# Patient Record
Sex: FEMALE | Race: WHITE | NOT HISPANIC OR LATINO
[De-identification: names, ages, dates, MRNs, and addresses within clinical notes are randomized per-mention and may not be internally consistent; named-entity substitution may affect disease eponyms.]

---

## 2021-01-01 ENCOUNTER — TRANSCRIPTION ENCOUNTER (OUTPATIENT)
Age: 71
End: 2021-01-01

## 2021-01-01 ENCOUNTER — EMERGENCY (EMERGENCY)
Facility: HOSPITAL | Age: 71
LOS: 1 days | Discharge: ANOTHER HOSPITAL | End: 2021-01-01
Attending: INTERNAL MEDICINE | Admitting: EMERGENCY MEDICINE
Payer: MEDICARE

## 2021-01-01 ENCOUNTER — RESULT REVIEW (OUTPATIENT)
Age: 71
End: 2021-01-01

## 2021-01-01 ENCOUNTER — APPOINTMENT (OUTPATIENT)
Dept: GYNECOLOGIC ONCOLOGY | Facility: HOSPITAL | Age: 71
End: 2021-01-01

## 2021-01-01 ENCOUNTER — INPATIENT (INPATIENT)
Facility: HOSPITAL | Age: 71
LOS: 0 days | End: 2021-09-16
Attending: OBSTETRICS & GYNECOLOGY | Admitting: OBSTETRICS & GYNECOLOGY
Payer: MEDICARE

## 2021-01-01 VITALS
RESPIRATION RATE: 16 BRPM | TEMPERATURE: 98 F | SYSTOLIC BLOOD PRESSURE: 91 MMHG | DIASTOLIC BLOOD PRESSURE: 62 MMHG | HEIGHT: 64 IN | HEART RATE: 110 BPM | WEIGHT: 164.91 LBS | OXYGEN SATURATION: 98 %

## 2021-01-01 VITALS
RESPIRATION RATE: 16 BRPM | HEART RATE: 140 BPM | SYSTOLIC BLOOD PRESSURE: 113 MMHG | DIASTOLIC BLOOD PRESSURE: 71 MMHG | OXYGEN SATURATION: 94 %

## 2021-01-01 VITALS
TEMPERATURE: 99 F | DIASTOLIC BLOOD PRESSURE: 98 MMHG | SYSTOLIC BLOOD PRESSURE: 133 MMHG | RESPIRATION RATE: 18 BRPM | OXYGEN SATURATION: 94 % | HEART RATE: 123 BPM

## 2021-01-01 VITALS — HEART RATE: 49 BPM | RESPIRATION RATE: 36 BRPM | OXYGEN SATURATION: 89 %

## 2021-01-01 DIAGNOSIS — R19.00 INTRA-ABDOMINAL AND PELVIC SWELLING, MASS AND LUMP, UNSPECIFIED SITE: ICD-10-CM

## 2021-01-01 LAB
ALBUMIN SERPL ELPH-MCNC: 3 G/DL — LOW (ref 3.3–5)
ALBUMIN SERPL ELPH-MCNC: 3.2 G/DL — LOW (ref 3.3–5)
ALP SERPL-CCNC: 74 U/L — SIGNIFICANT CHANGE UP (ref 40–120)
ALP SERPL-CCNC: 92 U/L — SIGNIFICANT CHANGE UP (ref 40–120)
ALT FLD-CCNC: 16 U/L — SIGNIFICANT CHANGE UP (ref 12–78)
ALT FLD-CCNC: 9 U/L — SIGNIFICANT CHANGE UP (ref 4–33)
AMYLASE P1 CFR SERPL: 29 U/L — SIGNIFICANT CHANGE UP (ref 25–125)
ANION GAP SERPL CALC-SCNC: 10 MMOL/L — SIGNIFICANT CHANGE UP (ref 5–17)
ANION GAP SERPL CALC-SCNC: 15 MMOL/L — HIGH (ref 7–14)
ANION GAP SERPL CALC-SCNC: 17 MMOL/L — HIGH (ref 7–14)
ANION GAP SERPL CALC-SCNC: SIGNIFICANT CHANGE UP MMOL/L (ref 7–14)
APPEARANCE UR: ABNORMAL
APTT BLD: 25.6 SEC — LOW (ref 27–36.3)
APTT BLD: 26 SEC — LOW (ref 27.5–35.5)
APTT BLD: 30.8 SEC — SIGNIFICANT CHANGE UP (ref 27–36.3)
AST SERPL-CCNC: 31 U/L — SIGNIFICANT CHANGE UP (ref 15–37)
AST SERPL-CCNC: 33 U/L — HIGH (ref 4–32)
BACTERIA # UR AUTO: ABNORMAL
BASOPHILS # BLD AUTO: 0.03 K/UL — SIGNIFICANT CHANGE UP (ref 0–0.2)
BASOPHILS NFR BLD AUTO: 0.9 % — SIGNIFICANT CHANGE UP (ref 0–2)
BILIRUB SERPL-MCNC: 2 MG/DL — HIGH (ref 0.2–1.2)
BILIRUB SERPL-MCNC: 3 MG/DL — HIGH (ref 0.2–1.2)
BILIRUB UR-MCNC: NEGATIVE — SIGNIFICANT CHANGE UP
BLD GP AB SCN SERPL QL: NEGATIVE — SIGNIFICANT CHANGE UP
BLD GP AB SCN SERPL QL: SIGNIFICANT CHANGE UP
BLOOD GAS ARTERIAL - LYTES,HGB,ICA,LACT RESULT: SIGNIFICANT CHANGE UP
BLOOD GAS ARTERIAL COMPREHENSIVE RESULT: SIGNIFICANT CHANGE UP
BLOOD GAS VENOUS COMPREHENSIVE RESULT: SIGNIFICANT CHANGE UP
BUN SERPL-MCNC: 20 MG/DL — SIGNIFICANT CHANGE UP (ref 7–23)
BUN SERPL-MCNC: 24 MG/DL — HIGH (ref 7–23)
BUN SERPL-MCNC: 25 MG/DL — HIGH (ref 7–23)
BUN SERPL-MCNC: 28 MG/DL — HIGH (ref 7–23)
CALCIUM SERPL-MCNC: 7.2 MG/DL — LOW (ref 8.4–10.5)
CALCIUM SERPL-MCNC: 7.3 MG/DL — LOW (ref 8.4–10.5)
CALCIUM SERPL-MCNC: 8.5 MG/DL — SIGNIFICANT CHANGE UP (ref 8.4–10.5)
CALCIUM SERPL-MCNC: 8.6 MG/DL — SIGNIFICANT CHANGE UP (ref 8.5–10.1)
CHLORIDE SERPL-SCNC: 106 MMOL/L — SIGNIFICANT CHANGE UP (ref 96–108)
CHLORIDE SERPL-SCNC: 106 MMOL/L — SIGNIFICANT CHANGE UP (ref 98–107)
CHLORIDE SERPL-SCNC: 112 MMOL/L — HIGH (ref 98–107)
CHLORIDE SERPL-SCNC: 112 MMOL/L — HIGH (ref 98–107)
CO2 SERPL-SCNC: 13 MMOL/L — LOW (ref 22–31)
CO2 SERPL-SCNC: 14 MMOL/L — LOW (ref 22–31)
CO2 SERPL-SCNC: 21 MMOL/L — LOW (ref 22–31)
CO2 SERPL-SCNC: <7 MMOL/L — CRITICAL LOW (ref 22–31)
COLOR SPEC: YELLOW — SIGNIFICANT CHANGE UP
CREAT SERPL-MCNC: 1.25 MG/DL — SIGNIFICANT CHANGE UP (ref 0.5–1.3)
CREAT SERPL-MCNC: 1.27 MG/DL — SIGNIFICANT CHANGE UP (ref 0.5–1.3)
CREAT SERPL-MCNC: 1.3 MG/DL — SIGNIFICANT CHANGE UP (ref 0.5–1.3)
CREAT SERPL-MCNC: 1.34 MG/DL — HIGH (ref 0.5–1.3)
DIFF PNL FLD: ABNORMAL
EOSINOPHIL # BLD AUTO: 0.03 K/UL — SIGNIFICANT CHANGE UP (ref 0–0.5)
EOSINOPHIL NFR BLD AUTO: 0.9 % — SIGNIFICANT CHANGE UP (ref 0–6)
EPI CELLS # UR: SIGNIFICANT CHANGE UP
GAS PNL BLDA: SIGNIFICANT CHANGE UP
GLUCOSE SERPL-MCNC: 127 MG/DL — HIGH (ref 70–99)
GLUCOSE SERPL-MCNC: 76 MG/DL — SIGNIFICANT CHANGE UP (ref 70–99)
GLUCOSE SERPL-MCNC: 78 MG/DL — SIGNIFICANT CHANGE UP (ref 70–99)
GLUCOSE SERPL-MCNC: 78 MG/DL — SIGNIFICANT CHANGE UP (ref 70–99)
GLUCOSE UR QL: NEGATIVE — SIGNIFICANT CHANGE UP
GRAM STN FLD: SIGNIFICANT CHANGE UP
GRAM STN FLD: SIGNIFICANT CHANGE UP
HCT VFR BLD CALC: 24.2 % — LOW (ref 34.5–45)
HCT VFR BLD CALC: 27.3 % — LOW (ref 34.5–45)
HCT VFR BLD CALC: 30.4 % — LOW (ref 34.5–45)
HCT VFR BLD CALC: 32.8 % — LOW (ref 34.5–45)
HGB BLD-MCNC: 10.5 G/DL — LOW (ref 11.5–15.5)
HGB BLD-MCNC: 7.6 G/DL — LOW (ref 11.5–15.5)
HGB BLD-MCNC: 8.1 G/DL — LOW (ref 11.5–15.5)
HGB BLD-MCNC: 9.5 G/DL — LOW (ref 11.5–15.5)
IANC: 2.33 K/UL — SIGNIFICANT CHANGE UP (ref 1.5–8.5)
INR BLD: 1.17 RATIO — HIGH (ref 0.88–1.16)
INR BLD: 1.26 RATIO — HIGH (ref 0.88–1.16)
INR BLD: 1.34 RATIO — HIGH (ref 0.88–1.16)
KETONES UR-MCNC: ABNORMAL
LACTATE SERPL-SCNC: 2.4 MMOL/L — HIGH (ref 0.5–2)
LACTATE SERPL-SCNC: 3.1 MMOL/L — HIGH (ref 0.7–2)
LACTATE SERPL-SCNC: 5.9 MMOL/L — CRITICAL HIGH (ref 0.7–2)
LEUKOCYTE ESTERASE UR-ACNC: ABNORMAL
LIDOCAIN IGE QN: 56 U/L — LOW (ref 73–393)
LYMPHOCYTES # BLD AUTO: 0.69 K/UL — LOW (ref 1–3.3)
LYMPHOCYTES # BLD AUTO: 22.1 % — SIGNIFICANT CHANGE UP (ref 13–44)
MAGNESIUM SERPL-MCNC: 1.6 MG/DL — SIGNIFICANT CHANGE UP (ref 1.6–2.6)
MAGNESIUM SERPL-MCNC: 2 MG/DL — SIGNIFICANT CHANGE UP (ref 1.6–2.6)
MCHC RBC-ENTMCNC: 25.3 PG — LOW (ref 27–34)
MCHC RBC-ENTMCNC: 25.7 PG — LOW (ref 27–34)
MCHC RBC-ENTMCNC: 27.7 PG — SIGNIFICANT CHANGE UP (ref 27–34)
MCHC RBC-ENTMCNC: 28.6 PG — SIGNIFICANT CHANGE UP (ref 27–34)
MCHC RBC-ENTMCNC: 29.7 GM/DL — LOW (ref 32–36)
MCHC RBC-ENTMCNC: 31.3 GM/DL — LOW (ref 32–36)
MCHC RBC-ENTMCNC: 31.4 GM/DL — LOW (ref 32–36)
MCHC RBC-ENTMCNC: 32 GM/DL — SIGNIFICANT CHANGE UP (ref 32–36)
MCV RBC AUTO: 80.9 FL — SIGNIFICANT CHANGE UP (ref 80–100)
MCV RBC AUTO: 86.5 FL — SIGNIFICANT CHANGE UP (ref 80–100)
MCV RBC AUTO: 86.7 FL — SIGNIFICANT CHANGE UP (ref 80–100)
MCV RBC AUTO: 91 FL — SIGNIFICANT CHANGE UP (ref 80–100)
MONOCYTES # BLD AUTO: 0.11 K/UL — SIGNIFICANT CHANGE UP (ref 0–0.9)
MONOCYTES NFR BLD AUTO: 3.5 % — SIGNIFICANT CHANGE UP (ref 2–14)
NEUTROPHILS # BLD AUTO: 2.1 K/UL — SIGNIFICANT CHANGE UP (ref 1.8–7.4)
NEUTROPHILS NFR BLD AUTO: 33.6 % — LOW (ref 43–77)
NITRITE UR-MCNC: NEGATIVE — SIGNIFICANT CHANGE UP
NRBC # BLD: 0 /100 WBCS — SIGNIFICANT CHANGE UP (ref 0–0)
NRBC # BLD: 1 /100 WBCS — SIGNIFICANT CHANGE UP
NRBC # BLD: 1 /100 WBCS — SIGNIFICANT CHANGE UP
NRBC # FLD: 0.07 K/UL — HIGH
NRBC # FLD: 0.09 K/UL — HIGH
PH UR: 5 — SIGNIFICANT CHANGE UP (ref 5–8)
PHOSPHATE SERPL-MCNC: 5.3 MG/DL — HIGH (ref 2.5–4.5)
PHOSPHATE SERPL-MCNC: 7.4 MG/DL — HIGH (ref 2.5–4.5)
PLATELET # BLD AUTO: 142 K/UL — LOW (ref 150–400)
PLATELET # BLD AUTO: 216 K/UL — SIGNIFICANT CHANGE UP (ref 150–400)
PLATELET # BLD AUTO: 316 K/UL — SIGNIFICANT CHANGE UP (ref 150–400)
PLATELET # BLD AUTO: 343 K/UL — SIGNIFICANT CHANGE UP (ref 150–400)
POTASSIUM SERPL-MCNC: 3.5 MMOL/L — SIGNIFICANT CHANGE UP (ref 3.5–5.3)
POTASSIUM SERPL-MCNC: 3.8 MMOL/L — SIGNIFICANT CHANGE UP (ref 3.5–5.3)
POTASSIUM SERPL-MCNC: 4.4 MMOL/L — SIGNIFICANT CHANGE UP (ref 3.5–5.3)
POTASSIUM SERPL-MCNC: SIGNIFICANT CHANGE UP MMOL/L (ref 3.5–5.3)
POTASSIUM SERPL-SCNC: 3.5 MMOL/L — SIGNIFICANT CHANGE UP (ref 3.5–5.3)
POTASSIUM SERPL-SCNC: 3.8 MMOL/L — SIGNIFICANT CHANGE UP (ref 3.5–5.3)
POTASSIUM SERPL-SCNC: 4.4 MMOL/L — SIGNIFICANT CHANGE UP (ref 3.5–5.3)
POTASSIUM SERPL-SCNC: SIGNIFICANT CHANGE UP MMOL/L (ref 3.5–5.3)
PROT SERPL-MCNC: 5.5 G/DL — LOW (ref 6–8.3)
PROT SERPL-MCNC: 7 G/DL — SIGNIFICANT CHANGE UP (ref 6–8.3)
PROT UR-MCNC: 15
PROTHROM AB SERPL-ACNC: 13.6 SEC — SIGNIFICANT CHANGE UP (ref 10.6–13.6)
PROTHROM AB SERPL-ACNC: 14.3 SEC — HIGH (ref 10.6–13.6)
PROTHROM AB SERPL-ACNC: 15.2 SEC — HIGH (ref 10.6–13.6)
RBC # BLD: 2.66 M/UL — LOW (ref 3.8–5.2)
RBC # BLD: 3.15 M/UL — LOW (ref 3.8–5.2)
RBC # BLD: 3.76 M/UL — LOW (ref 3.8–5.2)
RBC # BLD: 3.79 M/UL — LOW (ref 3.8–5.2)
RBC # FLD: 15.3 % — HIGH (ref 10.3–14.5)
RBC # FLD: 15.9 % — HIGH (ref 10.3–14.5)
RBC # FLD: 16.2 % — HIGH (ref 10.3–14.5)
RBC # FLD: 17 % — HIGH (ref 10.3–14.5)
RBC CASTS # UR COMP ASSIST: SIGNIFICANT CHANGE UP /HPF (ref 0–4)
RH IG SCN BLD-IMP: POSITIVE — SIGNIFICANT CHANGE UP
RH IG SCN BLD-IMP: POSITIVE — SIGNIFICANT CHANGE UP
SARS-COV-2 RNA SPEC QL NAA+PROBE: SIGNIFICANT CHANGE UP
SODIUM SERPL-SCNC: 136 MMOL/L — SIGNIFICANT CHANGE UP (ref 135–145)
SODIUM SERPL-SCNC: 137 MMOL/L — SIGNIFICANT CHANGE UP (ref 135–145)
SODIUM SERPL-SCNC: 137 MMOL/L — SIGNIFICANT CHANGE UP (ref 135–145)
SODIUM SERPL-SCNC: 140 MMOL/L — SIGNIFICANT CHANGE UP (ref 135–145)
SP GR SPEC: 1.01 — SIGNIFICANT CHANGE UP (ref 1.01–1.02)
SPECIMEN SOURCE: SIGNIFICANT CHANGE UP
SPECIMEN SOURCE: SIGNIFICANT CHANGE UP
UROBILINOGEN FLD QL: NEGATIVE — SIGNIFICANT CHANGE UP
WBC # BLD: 11.36 K/UL — HIGH (ref 3.8–10.5)
WBC # BLD: 3.13 K/UL — LOW (ref 3.8–10.5)
WBC # BLD: 7.25 K/UL — SIGNIFICANT CHANGE UP (ref 3.8–10.5)
WBC # BLD: 7.26 K/UL — SIGNIFICANT CHANGE UP (ref 3.8–10.5)
WBC # FLD AUTO: 11.36 K/UL — HIGH (ref 3.8–10.5)
WBC # FLD AUTO: 3.13 K/UL — LOW (ref 3.8–10.5)
WBC # FLD AUTO: 7.25 K/UL — SIGNIFICANT CHANGE UP (ref 3.8–10.5)
WBC # FLD AUTO: 7.26 K/UL — SIGNIFICANT CHANGE UP (ref 3.8–10.5)
WBC UR QL: SIGNIFICANT CHANGE UP

## 2021-01-01 PROCEDURE — 88305 TISSUE EXAM BY PATHOLOGIST: CPT | Mod: 26

## 2021-01-01 PROCEDURE — 99285 EMERGENCY DEPT VISIT HI MDM: CPT

## 2021-01-01 PROCEDURE — 87086 URINE CULTURE/COLONY COUNT: CPT

## 2021-01-01 PROCEDURE — 96375 TX/PRO/DX INJ NEW DRUG ADDON: CPT

## 2021-01-01 PROCEDURE — 71045 X-RAY EXAM CHEST 1 VIEW: CPT | Mod: 26

## 2021-01-01 PROCEDURE — 99223 1ST HOSP IP/OBS HIGH 75: CPT | Mod: 25,57,GC

## 2021-01-01 PROCEDURE — 74177 CT ABD & PELVIS W/CONTRAST: CPT | Mod: 26,MA

## 2021-01-01 PROCEDURE — 92950 HEART/LUNG RESUSCITATION CPR: CPT | Mod: GC

## 2021-01-01 PROCEDURE — 97608 NEG PRS WND THER NDME>50SQCM: CPT | Mod: GC

## 2021-01-01 PROCEDURE — 85730 THROMBOPLASTIN TIME PARTIAL: CPT

## 2021-01-01 PROCEDURE — 99285 EMERGENCY DEPT VISIT HI MDM: CPT | Mod: 25

## 2021-01-01 PROCEDURE — 71045 X-RAY EXAM CHEST 1 VIEW: CPT | Mod: 26,77

## 2021-01-01 PROCEDURE — 93010 ELECTROCARDIOGRAM REPORT: CPT

## 2021-01-01 PROCEDURE — 88307 TISSUE EXAM BY PATHOLOGIST: CPT | Mod: 26

## 2021-01-01 PROCEDURE — 44120 REMOVAL OF SMALL INTESTINE: CPT | Mod: GC

## 2021-01-01 PROCEDURE — 44120 REMOVAL OF SMALL INTESTINE: CPT | Mod: 52,62

## 2021-01-01 PROCEDURE — 50715 RELEASE OF URETER: CPT | Mod: 50,59

## 2021-01-01 PROCEDURE — 81001 URINALYSIS AUTO W/SCOPE: CPT

## 2021-01-01 PROCEDURE — 83605 ASSAY OF LACTIC ACID: CPT

## 2021-01-01 PROCEDURE — 80053 COMPREHEN METABOLIC PANEL: CPT

## 2021-01-01 PROCEDURE — 86901 BLOOD TYPING SEROLOGIC RH(D): CPT

## 2021-01-01 PROCEDURE — 86900 BLOOD TYPING SEROLOGIC ABO: CPT

## 2021-01-01 PROCEDURE — 58950 RESECT OVARIAN MALIGNANCY: CPT

## 2021-01-01 PROCEDURE — 96361 HYDRATE IV INFUSION ADD-ON: CPT

## 2021-01-01 PROCEDURE — 88332 PATH CONSLTJ SURG EA ADD BLK: CPT | Mod: 26

## 2021-01-01 PROCEDURE — 82150 ASSAY OF AMYLASE: CPT

## 2021-01-01 PROCEDURE — 87040 BLOOD CULTURE FOR BACTERIA: CPT

## 2021-01-01 PROCEDURE — 86850 RBC ANTIBODY SCREEN: CPT

## 2021-01-01 PROCEDURE — 88331 PATH CONSLTJ SURG 1 BLK 1SPC: CPT | Mod: 26

## 2021-01-01 PROCEDURE — 96374 THER/PROPH/DIAG INJ IV PUSH: CPT | Mod: XU

## 2021-01-01 PROCEDURE — U0005: CPT

## 2021-01-01 PROCEDURE — 71045 X-RAY EXAM CHEST 1 VIEW: CPT

## 2021-01-01 PROCEDURE — 74177 CT ABD & PELVIS W/CONTRAST: CPT | Mod: MA

## 2021-01-01 PROCEDURE — 83690 ASSAY OF LIPASE: CPT

## 2021-01-01 PROCEDURE — 88360 TUMOR IMMUNOHISTOCHEM/MANUAL: CPT | Mod: 26

## 2021-01-01 PROCEDURE — 36415 COLL VENOUS BLD VENIPUNCTURE: CPT

## 2021-01-01 PROCEDURE — 99291 CRITICAL CARE FIRST HOUR: CPT | Mod: 25,GC

## 2021-01-01 PROCEDURE — 88341 IMHCHEM/IMCYTCHM EA ADD ANTB: CPT | Mod: 26,59

## 2021-01-01 PROCEDURE — 85610 PROTHROMBIN TIME: CPT

## 2021-01-01 PROCEDURE — 87077 CULTURE AEROBIC IDENTIFY: CPT

## 2021-01-01 PROCEDURE — 93005 ELECTROCARDIOGRAM TRACING: CPT

## 2021-01-01 PROCEDURE — 88342 IMHCHEM/IMCYTCHM 1ST ANTB: CPT | Mod: 26,59

## 2021-01-01 PROCEDURE — 85027 COMPLETE CBC AUTOMATED: CPT

## 2021-01-01 PROCEDURE — U0003: CPT

## 2021-01-01 RX ORDER — FENTANYL CITRATE 50 UG/ML
50 INJECTION INTRAVENOUS ONCE
Refills: 0 | Status: DISCONTINUED | OUTPATIENT
Start: 2021-01-01 | End: 2021-01-01

## 2021-01-01 RX ORDER — MAGNESIUM SULFATE 500 MG/ML
2 VIAL (ML) INJECTION ONCE
Refills: 0 | Status: COMPLETED | OUTPATIENT
Start: 2021-01-01 | End: 2021-01-01

## 2021-01-01 RX ORDER — SODIUM CHLORIDE 9 MG/ML
10 INJECTION INTRAMUSCULAR; INTRAVENOUS; SUBCUTANEOUS
Refills: 0 | Status: DISCONTINUED | OUTPATIENT
Start: 2021-01-01 | End: 2021-01-01

## 2021-01-01 RX ORDER — ACETAMINOPHEN 500 MG
1000 TABLET ORAL ONCE
Refills: 0 | Status: COMPLETED | OUTPATIENT
Start: 2021-01-01 | End: 2021-01-01

## 2021-01-01 RX ORDER — PHENYLEPHRINE HYDROCHLORIDE 10 MG/ML
0 INJECTION INTRAVENOUS
Qty: 40 | Refills: 0 | Status: DISCONTINUED | OUTPATIENT
Start: 2021-01-01 | End: 2021-01-01

## 2021-01-01 RX ORDER — SODIUM CHLORIDE 9 MG/ML
1000 INJECTION INTRAMUSCULAR; INTRAVENOUS; SUBCUTANEOUS ONCE
Refills: 0 | Status: COMPLETED | OUTPATIENT
Start: 2021-01-01 | End: 2021-01-01

## 2021-01-01 RX ORDER — NOREPINEPHRINE BITARTRATE/D5W 8 MG/250ML
0.05 PLASTIC BAG, INJECTION (ML) INTRAVENOUS
Qty: 8 | Refills: 0 | Status: DISCONTINUED | OUTPATIENT
Start: 2021-01-01 | End: 2021-01-01

## 2021-01-01 RX ORDER — MORPHINE SULFATE 50 MG/1
4 CAPSULE, EXTENDED RELEASE ORAL ONCE
Refills: 0 | Status: DISCONTINUED | OUTPATIENT
Start: 2021-01-01 | End: 2021-01-01

## 2021-01-01 RX ORDER — INSULIN HUMAN 100 [IU]/ML
10 INJECTION, SOLUTION SUBCUTANEOUS ONCE
Refills: 0 | Status: COMPLETED | OUTPATIENT
Start: 2021-01-01 | End: 2021-01-01

## 2021-01-01 RX ORDER — SODIUM BICARBONATE 1 MEQ/ML
50 SYRINGE (ML) INTRAVENOUS ONCE
Refills: 0 | Status: COMPLETED | OUTPATIENT
Start: 2021-01-01 | End: 2021-01-01

## 2021-01-01 RX ORDER — PIPERACILLIN AND TAZOBACTAM 4; .5 G/20ML; G/20ML
3.38 INJECTION, POWDER, LYOPHILIZED, FOR SOLUTION INTRAVENOUS EVERY 8 HOURS
Refills: 0 | Status: DISCONTINUED | OUTPATIENT
Start: 2021-01-01 | End: 2021-01-01

## 2021-01-01 RX ORDER — HYDROMORPHONE HYDROCHLORIDE 2 MG/ML
0.5 INJECTION INTRAMUSCULAR; INTRAVENOUS; SUBCUTANEOUS ONCE
Refills: 0 | Status: DISCONTINUED | OUTPATIENT
Start: 2021-01-01 | End: 2021-01-01

## 2021-01-01 RX ORDER — CHLORHEXIDINE GLUCONATE 213 G/1000ML
15 SOLUTION TOPICAL EVERY 12 HOURS
Refills: 0 | Status: DISCONTINUED | OUTPATIENT
Start: 2021-01-01 | End: 2021-01-01

## 2021-01-01 RX ORDER — CHLORHEXIDINE GLUCONATE 213 G/1000ML
1 SOLUTION TOPICAL
Refills: 0 | Status: DISCONTINUED | OUTPATIENT
Start: 2021-01-01 | End: 2021-01-01

## 2021-01-01 RX ORDER — VASOPRESSIN 20 [USP'U]/ML
0.04 INJECTION INTRAVENOUS
Qty: 50 | Refills: 0 | Status: DISCONTINUED | OUTPATIENT
Start: 2021-01-01 | End: 2021-01-01

## 2021-01-01 RX ORDER — SODIUM CHLORIDE 9 MG/ML
2300 INJECTION INTRAMUSCULAR; INTRAVENOUS; SUBCUTANEOUS ONCE
Refills: 0 | Status: COMPLETED | OUTPATIENT
Start: 2021-01-01 | End: 2021-01-01

## 2021-01-01 RX ORDER — NOREPINEPHRINE BITARTRATE/D5W 8 MG/250ML
0.1 PLASTIC BAG, INJECTION (ML) INTRAVENOUS
Qty: 8 | Refills: 0 | Status: DISCONTINUED | OUTPATIENT
Start: 2021-01-01 | End: 2021-01-01

## 2021-01-01 RX ORDER — SODIUM BICARBONATE 1 MEQ/ML
0.15 SYRINGE (ML) INTRAVENOUS
Qty: 150 | Refills: 0 | Status: DISCONTINUED | OUTPATIENT
Start: 2021-01-01 | End: 2021-01-01

## 2021-01-01 RX ORDER — CALCIUM GLUCONATE 100 MG/ML
2 VIAL (ML) INTRAVENOUS ONCE
Refills: 0 | Status: COMPLETED | OUTPATIENT
Start: 2021-01-01 | End: 2021-01-01

## 2021-01-01 RX ORDER — HEPARIN SODIUM 5000 [USP'U]/ML
5000 INJECTION INTRAVENOUS; SUBCUTANEOUS EVERY 8 HOURS
Refills: 0 | Status: DISCONTINUED | OUTPATIENT
Start: 2021-01-01 | End: 2021-01-01

## 2021-01-01 RX ORDER — PROPOFOL 10 MG/ML
30 INJECTION, EMULSION INTRAVENOUS
Qty: 500 | Refills: 0 | Status: DISCONTINUED | OUTPATIENT
Start: 2021-01-01 | End: 2021-01-01

## 2021-01-01 RX ORDER — PIPERACILLIN AND TAZOBACTAM 4; .5 G/20ML; G/20ML
3.38 INJECTION, POWDER, LYOPHILIZED, FOR SOLUTION INTRAVENOUS ONCE
Refills: 0 | Status: COMPLETED | OUTPATIENT
Start: 2021-01-01 | End: 2021-01-01

## 2021-01-01 RX ORDER — ONDANSETRON 8 MG/1
4 TABLET, FILM COATED ORAL ONCE
Refills: 0 | Status: COMPLETED | OUTPATIENT
Start: 2021-01-01 | End: 2021-01-01

## 2021-01-01 RX ORDER — SODIUM CHLORIDE 9 MG/ML
1000 INJECTION INTRAMUSCULAR; INTRAVENOUS; SUBCUTANEOUS ONCE
Refills: 0 | Status: DISCONTINUED | OUTPATIENT
Start: 2021-01-01 | End: 2021-01-01

## 2021-01-01 RX ORDER — SODIUM CHLORIDE 9 MG/ML
1000 INJECTION, SOLUTION INTRAVENOUS
Refills: 0 | Status: DISCONTINUED | OUTPATIENT
Start: 2021-01-01 | End: 2021-01-01

## 2021-01-01 RX ORDER — PIPERACILLIN AND TAZOBACTAM 4; .5 G/20ML; G/20ML
3.38 INJECTION, POWDER, LYOPHILIZED, FOR SOLUTION INTRAVENOUS ONCE
Refills: 0 | Status: DISCONTINUED | OUTPATIENT
Start: 2021-01-01 | End: 2021-01-01

## 2021-01-01 RX ORDER — DEXTROSE 50 % IN WATER 50 %
50 SYRINGE (ML) INTRAVENOUS ONCE
Refills: 0 | Status: COMPLETED | OUTPATIENT
Start: 2021-01-01 | End: 2021-01-01

## 2021-01-01 RX ADMIN — PIPERACILLIN AND TAZOBACTAM 200 GRAM(S): 4; .5 INJECTION, POWDER, LYOPHILIZED, FOR SOLUTION INTRAVENOUS at 05:51

## 2021-01-01 RX ADMIN — Medication 400 MILLIGRAM(S): at 09:59

## 2021-01-01 RX ADMIN — PROPOFOL 12.6 MICROGRAM(S)/KG/MIN: 10 INJECTION, EMULSION INTRAVENOUS at 19:00

## 2021-01-01 RX ADMIN — SODIUM CHLORIDE 75 MILLILITER(S): 9 INJECTION, SOLUTION INTRAVENOUS at 19:00

## 2021-01-01 RX ADMIN — MORPHINE SULFATE 4 MILLIGRAM(S): 50 CAPSULE, EXTENDED RELEASE ORAL at 04:42

## 2021-01-01 RX ADMIN — Medication 50 MILLIEQUIVALENT(S): at 21:00

## 2021-01-01 RX ADMIN — Medication 1000 MILLIGRAM(S): at 09:34

## 2021-01-01 RX ADMIN — Medication 6.56 MICROGRAM(S)/KG/MIN: at 11:30

## 2021-01-01 RX ADMIN — SODIUM CHLORIDE 2300 MILLILITER(S): 9 INJECTION INTRAMUSCULAR; INTRAVENOUS; SUBCUTANEOUS at 07:01

## 2021-01-01 RX ADMIN — SODIUM CHLORIDE 2300 MILLILITER(S): 9 INJECTION INTRAMUSCULAR; INTRAVENOUS; SUBCUTANEOUS at 06:01

## 2021-01-01 RX ADMIN — PHENYLEPHRINE HYDROCHLORIDE 0.1 MICROGRAM(S)/KG/MIN: 10 INJECTION INTRAVENOUS at 19:00

## 2021-01-01 RX ADMIN — FENTANYL CITRATE 50 MICROGRAM(S): 50 INJECTION INTRAVENOUS at 11:30

## 2021-01-01 RX ADMIN — HYDROMORPHONE HYDROCHLORIDE 0.5 MILLIGRAM(S): 2 INJECTION INTRAMUSCULAR; INTRAVENOUS; SUBCUTANEOUS at 05:48

## 2021-01-01 RX ADMIN — ONDANSETRON 4 MILLIGRAM(S): 8 TABLET, FILM COATED ORAL at 04:14

## 2021-01-01 RX ADMIN — SODIUM CHLORIDE 1000 MILLILITER(S): 9 INJECTION INTRAMUSCULAR; INTRAVENOUS; SUBCUTANEOUS at 09:15

## 2021-01-01 RX ADMIN — PROPOFOL 12.6 MICROGRAM(S)/KG/MIN: 10 INJECTION, EMULSION INTRAVENOUS at 17:19

## 2021-01-01 RX ADMIN — Medication 6.56 MICROGRAM(S)/KG/MIN: at 17:19

## 2021-01-01 RX ADMIN — SODIUM CHLORIDE 1000 MILLILITER(S): 9 INJECTION INTRAMUSCULAR; INTRAVENOUS; SUBCUTANEOUS at 04:13

## 2021-01-01 RX ADMIN — VASOPRESSIN 2.4 UNIT(S)/MIN: 20 INJECTION INTRAVENOUS at 17:58

## 2021-01-01 RX ADMIN — Medication 50 MILLILITER(S): at 21:00

## 2021-01-01 RX ADMIN — Medication 6.56 MICROGRAM(S)/KG/MIN: at 19:00

## 2021-01-01 RX ADMIN — SODIUM CHLORIDE 1000 MILLILITER(S): 9 INJECTION INTRAMUSCULAR; INTRAVENOUS; SUBCUTANEOUS at 08:15

## 2021-01-01 RX ADMIN — HYDROMORPHONE HYDROCHLORIDE 0.5 MILLIGRAM(S): 2 INJECTION INTRAMUSCULAR; INTRAVENOUS; SUBCUTANEOUS at 05:33

## 2021-09-16 NOTE — ED PROVIDER NOTE - ATTENDING CONTRIBUTION TO CARE
Attending note:   After face to face evaluation of this patient, I concur with above noted hx, pe, and care plan for this patient.  Tsang: 71 yof with no consistent medical care transferred from [place] ED for abdominal pain. Pt presented to ED for abdominal pain that started suddenly last night with diarrhea.  also noted very slight weight loss. Pt has had no medical care in appx 10 years. In ED there, pt diagnosed with pelvic mass and perforated bowel, ischemic bowel, free air. Found to have increasing lactate as well, Pt given antibiotics and 4L fluids, echavarria placed, arrived with Ofiremev running in IV. Pt appears uncomfortable, writhing in bed, BP initially stable 105s, then 85s, now improving to 100 on Levophed, tachy 115, RR increased, clear lungs, no murmur, pt responding to questions appropriately,  at bedside. abd diffusely tender, not distended, no edema, pulses present equally.   EKG sinus tachy. Repeat labs for OR, including blood gas, fluids, pressors, Gynonc and gen surg at bedside. pain meds.

## 2021-09-16 NOTE — H&P ADULT - ASSESSMENT
70 y/o P2 presenting with large adnexal mass, perforated viscus. 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 Patient is febrile and hypotensive, in critical condition.    Plan OR for ex-lap, possible abdominal mass resection, possible Total Abdominal Hysterectomy,  bilateral salpingooophorectomy possible diverting ostomy, and any other indicated lifesaving measures.     Neuro: IV pain medication prn  CV: Patient hemodynamically unstable.  Currently receiving levophed.   Pulm: saturating well on room air   GI: NPO for OR   : Fuentes in situ   Heme: SCD's in OR for DVT ppx.    ID: afebrile   FEN: LR@125.  Replete electolytes prn     Dispo: To OR for procedure as detailed above. Consents signed with patient and patient's  is at bedside. All questions answered.      Patient seen and d/w  Rafi and Dr. Re Morejon, PGY-2   70 y/o P2 presenting with large adnexal mass, perforated viscus. Differential diagnosis includes gynecologic vs. GI cancer. Patient is febrile and hypotensive, in critical condition.    Plan OR for ex-lap, possible abdominal mass resection, possible Total Abdominal Hysterectomy,  bilateral salpingooophorectomy possible diverting ostomy, and any other indicated lifesaving measures.     Neuro: IV pain medication prn  CV: Patient hemodynamically unstable.  Currently receiving levophed.   Pulm: saturating well on room air   GI: NPO for OR   : Fuentes in situ   Heme: SCD's in OR for DVT ppx.    ID: afebrile   FEN: LR@125.  Replete electolytes prn     Dispo: To OR in coordination with general surgery for procedure as detailed above. Consents signed with patient and patient's  is at bedside. All questions answered. General Surgery at bedside.       Patient seen and d/w  Rafi and Dr. Re Morejon, PGY-2   70 y/o P2 presenting with large adnexal mass, perforated viscus. Differential diagnosis includes gynecologic vs. GI cancer. Patient is febrile and hypotensive, in critical condition.    Plan OR for ex-lap, possible abdominal mass resection, possible total abdominal hysterectomy,  bilateral salpingooophorectomy possible diverting ostomy, and any other indicated lifesaving measures.     Neuro: IV pain medication prn  CV: Patient hemodynamically unstable, hypotensive with SBP in 80's.  Currently receiving levophed gtt   Pulm: saturating well on room air   GI: NPO for OR   : Fuentes in situ   Heme: SCD's in OR for DVT ppx.    ID: afebrile   FEN: LR@125.  Replete electrolytes prn     Dispo: To OR in coordination with general surgery for procedure as detailed above. Consents signed with patient and patient's  is at bedside. All questions answered. General Surgery at bedside.       Patient seen and d/w  Rafi and Dr. Re Morejon, PGY-2

## 2021-09-16 NOTE — PROVIDER CONTACT NOTE (CRITICAL VALUE NOTIFICATION) - NAME OF MD/NP/PA/DO NOTIFIED:
Dr. Duque
ALEX De Paz over at [facility]. Pt is under the care of ALEX De Paz at this time.
Dr. Pelletier

## 2021-09-16 NOTE — BRIEF OPERATIVE NOTE - NSICDXBRIEFPREOP_GEN_ALL_CORE_FT
PRE-OP DIAGNOSIS:  Pelvic mass in female 16-Sep-2021 17:59:04  [place]  Pneumoperitoneum 16-Sep-2021 17:59:15  [place]  Bilateral hydronephrosis 16-Sep-2021 17:59:25  [place]do  
PRE-OP DIAGNOSIS:  Pelvic mass in female 16-Sep-2021 17:59:04  [place]do  Pneumoperitoneum 16-Sep-2021 17:59:15  [place]do  Bilateral hydronephrosis 16-Sep-2021 17:59:25  [place]do

## 2021-09-16 NOTE — CONSULT NOTE ADULT - PROBLEM SELECTOR RECOMMENDATION 9
Ovarian CA markers- ucg  Continue ABX- received 1 dose of zosyn  Continue IV fluids- if no void, may need to catheterize patient after bladder scan  Monitor vitals.  GYN, GYN/ONC consult.   Discussed with Dr. Dykes.

## 2021-09-16 NOTE — BRIEF OPERATIVE NOTE - NSICDXBRIEFPOSTOP_GEN_ALL_CORE_FT
POST-OP DIAGNOSIS:  Carcinomatosis 16-Sep-2021 18:00:29  [place] Gr[place]do  
POST-OP DIAGNOSIS:  Carcinomatosis 16-Sep-2021 18:00:29  [place]

## 2021-09-16 NOTE — ED ADULT NURSE NOTE - OBJECTIVE STATEMENT
Pt transferred from [place] for surgical eval of abdominal mass; pt arrives with echavarria catheter in place, placed this a.m. at [place], draining 150mL dark urine; pt's abdomen distended, firm, tender on palpation; pt rec'd pain medication including morphine and dilaudid at [place]; NPO since midnight; has dental bridge ( thinks upper). Left 20 G IV in tact, clean and dry, NS infusing.

## 2021-09-16 NOTE — H&P ADULT - NSHPADDITIONALINFOADULT_GEN_ALL_CORE
Seen in ED with F (AN) ~12Noon. CT reviewed. Labs reviewed. Exam, uncomfortably woman. Protuberant abdomen with mass. No apparent hernia. Ext 2-3+ edema L>R.   I had spoken with ED Attd at [facility] re the case and the plan for transfer after stabilization. Spoke with transfer center staff x 2 re the case and the plan.   After seeing pt in ED, I spoke with the pt and her family member re the findings and the life-threatening nature of her condition. We disc the plan for admission and immediate surgical exploration with the gen surg team. The poss need for resection, debulking, bowel and/or urinary resection was disc. All Q/A.

## 2021-09-16 NOTE — BRIEF OPERATIVE NOTE - OPERATION/FINDINGS
Pt taken urgently to OR from ED with evidence of an abdomino-pelvic mass, pneumoperitoneum, bilateral hydronephrosis.  EUA: 8-10 cm right buttock ecchymosis. Palpably nl vagina and cervix. Pelvis filled with mass. Fullness of RVS. RV with narrowing~10+ cm.  Ex Lap: malodorous peritoneal fluid evacuated. Omentum adherent to the mass but o/w w/o nodularity. ~25cm mass with adherence of appendix tip to right side of the mass, a portion of the ileum adherent to the posterior, undersurface, and left lateral aspect of the mass, the mass was adherent to the posterior uterus and replaced the right adnexa. The left adnexa with adherent but was o/w nl yet appeared necrotic though of nl size. The ant CDS with free but had some necrotic debris. SB o/w nl. Tumor on the cecal serosa. Gutters and subdiaphrams smooths. Stomach neg. The prox sigmoid appeared free of the mass, but the distal sigmoid encased with tumor and there was a fullness in the recto-sigmoid (c/w the EUA) palpable after the mass was resected. Both PSW explored and hydro noted. The peritoneum and ureters released from the mass bilaterally.     We were able to resect the mass/the right adnexa, and a portion of the SB (Ileum) en bloc. A portion of the mass (necrotic adherent tumor in the CDS was sent from a FS, c/w malignancy, poss mesenchymal, deferring the diagnosis to permanent sections. I elected to defer hysterectomy as it would not have added significantly to debulking and may have risked injury to the bladder, ureters, and rectosigmoid areas.     Dr Duque evaluated the findings thoughout the case and Placed an abthera in anticipation of RTOR after resuscitation, likely for addition resection, diversion, rectosigmoid evaluation, and abdominal wall closed.     Vistaseal. CC.

## 2021-09-16 NOTE — CONSULT NOTE ADULT - REASON FOR ADMISSION
abdominal pain in the setting of perforated viscus and abdominal mass
abdominal pain in the setting of perforated viscus and abdominal mass

## 2021-09-16 NOTE — ED PROVIDER NOTE - OBJECTIVE STATEMENT
72yo female with no known pmh presenting with acute onset abdominal pain last night as transfer from [place] after she was found to have free air 2/2 perforated viscus due to abdominal mass of likely adnexal origin.  Per , pain started acutely last night.  Has not been to a doctor in years.    Curtis MR

## 2021-09-16 NOTE — CONSULT NOTE ADULT - SUBJECTIVE AND OBJECTIVE BOX
GENERAL SURGERY CONSULT NOTE  --------------------------------------------------------------------------------------------    Patient is a 71y old female with no PMH but has not seen a doctor for many years who is presenting to the ED as a transfer from [facility].  Patient started having nausea, vomiting and diarrhea last night.  The abdominal pain became worse today and therefor presented to the ED.  She has noticed increasing size of her abdomen over several months but has not gone to a doctor for it.  At [place] ED, she was hypotensive, tachycardic, and found to have a large pelvic mass with portal venous gas and pneumoperitoneum likely from small bowel.  She received a dose of zosyn and 4.3L of fluid.  She was then transferred to [facility] for further management.    ROS: 10-system review is otherwise negative except HPI above.      PAST MEDICAL & SURGICAL HISTORY:  No pertinent past medical history    No significant past surgical history      FAMILY HISTORY:      ALLERGIES: No Known Allergies      CURRENT MEDICATIONS  MEDICATIONS (STANDING): norepinephrine Infusion 0.05 MICROgram(s)/kG/Min IV Continuous <Continuous>  sodium chloride 0.9% Bolus 1000 milliLiter(s) IV Bolus once    MEDICATIONS (PRN):  --------------------------------------------------------------------------------------------    Vitals:   T(C): 38.1 (09-16-21 @ 10:49), Max: 38.1 (09-16-21 @ 10:49)  HR: 121 (09-16-21 @ 11:25) (120 - 123)  BP: 103/72 (09-16-21 @ 11:25) (85/53 - 133/98)  RR: 28 (09-16-21 @ 11:25) (18 - 28)  SpO2: 98% (09-16-21 @ 11:25) (94% - 98%)  CAPILLARY BLOOD GLUCOSE        CAPILLARY BLOOD GLUCOSE              PHYSICAL EXAM:   General: Lying uncomfortably in bed.  HEENT: NC/AT,  Cardio: tachycardic  Resp: Good effort  GI/Abd: Distended with large palpable mass encompassing the entire abdomen.  Diffusely tender throughout abdomen.  Rectal exam revealed compression of the rectum.  Musculoskeletal: All 4 extremities moving spontaneously, no limitations  --------------------------------------------------------------------------------------------    LABS  CBC (09-16 @ 11:02)                              8.1<L>                         3.13<L>  )----------------(  316        33.6<L>% Neutrophils, 22.1  % Lymphocytes, ANC: 2.10                                27.3<L>    BMP (09-16 @ 11:02)             137     |  106     |  24<H> 		Ca++ --      Ca 7.2<L>             ---------------------------------( 76    		Mg --                 3.8     |  14<L>   |  1.27  			Ph --        LFTs (09-16 @ 11:02)      TPro 5.5<L> / Alb 3.2<L> / TBili 3.0<H> / DBili -- / AST 33<H> / ALT 9 / AlkPhos 74    Coags (09-16 @ 11:02)  aPTT 25.6<L> / INR 1.26<H> / PT 14.3<H>      ABG (09-16 @ 15:09)     7.29<L> / 36<H> / 258<H> / 17<L> / -8.5<L> / 100.0<H>%     Lactate:    ABG (09-16 @ 14:12)     7.28<L> / 35 / 244<H> / 16<L> / -9.5<L> / 99.3<H>%     Lactate:      VBG (09-16 @ 11:02)     7.29<L> / 33<L> / 28 / 16<L> / -9.8<L> / 45.8%     Lactate: 3.9<H>    --------------------------------------------------------------------------------------------    MICROBIOLOGY      --------------------------------------------------------------------------------------------      ASSESSMENT: Patient is a 71 year old female with reportedly no PMH who is has a large 30cm pelvic mass with perforated viscus likely small bowel.    PLAN:   - Resuscitation with IVFs.  - Continue Abx  - Will go emergently to operating room with general surgery and GYN/ONC  - Consent obtained and placed in chart    Wellington Marcos PGY5  B team surgery #[id number] GENERAL SURGERY CONSULT NOTE  --------------------------------------------------------------------------------------------    Patient is a 71y old female with no PMH but has not seen a doctor for many years who is presenting to the ED as a transfer from [facility].  Patient started having nausea, vomiting and diarrhea last night.  The abdominal pain became worse today and therefor presented to the ED.  She has noticed increasing size of her abdomen over several months but has not gone to a doctor for it.  At [place] ED, she was hypotensive, tachycardic, and found to have a large pelvic mass with portal venous gas and pneumoperitoneum likely from small bowel.  She received a dose of zosyn and 4.3L of fluid.  She was then transferred to [facility] for further management.    ROS: 10-system review is otherwise negative except HPI above.      PAST MEDICAL & SURGICAL HISTORY:  No pertinent past medical history    No significant past surgical history      FAMILY HISTORY: unknown, patient unable to recall at present time due to pain/altered mentation      ALLERGIES: No Known Allergies      CURRENT MEDICATIONS  MEDICATIONS (STANDING): norepinephrine Infusion 0.05 MICROgram(s)/kG/Min IV Continuous <Continuous>  sodium chloride 0.9% Bolus 1000 milliLiter(s) IV Bolus once    MEDICATIONS (PRN):  --------------------------------------------------------------------------------------------    Vitals:   T(C): 38.1 (09-16-21 @ 10:49), Max: 38.1 (09-16-21 @ 10:49)  HR: 121 (09-16-21 @ 11:25) (120 - 123)  BP: 103/72 (09-16-21 @ 11:25) (85/53 - 133/98)  RR: 28 (09-16-21 @ 11:25) (18 - 28)  SpO2: 98% (09-16-21 @ 11:25) (94% - 98%)  CAPILLARY BLOOD GLUCOSE        CAPILLARY BLOOD GLUCOSE              PHYSICAL EXAM:   General: Lying uncomfortably in bed.  HEENT: NC/AT,  Cardio: tachycardic  Resp: Good effort  GI/Abd: Distended with large palpable mass encompassing the entire abdomen.  Diffusely tender throughout abdomen.  Rectal exam revealed compression of the rectum.  Musculoskeletal: All 4 extremities moving spontaneously, no limitations  --------------------------------------------------------------------------------------------    LABS  CBC (09-16 @ 11:02)                              8.1<L>                         3.13<L>  )----------------(  316        33.6<L>% Neutrophils, 22.1  % Lymphocytes, ANC: 2.10                                27.3<L>    BMP (09-16 @ 11:02)             137     |  106     |  24<H> 		Ca++ --      Ca 7.2<L>             ---------------------------------( 76    		Mg --                 3.8     |  14<L>   |  1.27  			Ph --        LFTs (09-16 @ 11:02)      TPro 5.5<L> / Alb 3.2<L> / TBili 3.0<H> / DBili -- / AST 33<H> / ALT 9 / AlkPhos 74    Coags (09-16 @ 11:02)  aPTT 25.6<L> / INR 1.26<H> / PT 14.3<H>      ABG (09-16 @ 15:09)     7.29<L> / 36<H> / 258<H> / 17<L> / -8.5<L> / 100.0<H>%     Lactate:    ABG (09-16 @ 14:12)     7.28<L> / 35 / 244<H> / 16<L> / -9.5<L> / 99.3<H>%     Lactate:      VBG (09-16 @ 11:02)     7.29<L> / 33<L> / 28 / 16<L> / -9.8<L> / 45.8%     Lactate: 3.9<H>    --------------------------------------------------------------------------------------------    MICROBIOLOGY      --------------------------------------------------------------------------------------------      ASSESSMENT: Patient is a 71 year old female with reportedly no PMH who is has a large 30cm pelvic mass with perforated viscus likely small bowel.    PLAN:   - Resuscitation with IVFs.  - Continue Abx  - Will go emergently to operating room with general surgery and GYN/ONC  - Consent obtained and placed in chart    Wellington Marcos PGY5  B team surgery #[id number]

## 2021-09-16 NOTE — H&P ADULT - NSHPLABSRESULTS_GEN_ALL_CORE
8.1    3.13  )-----------( 316      ( 16 Sep 2021 11:02 )             27.3       09-16    137  |  106  |  24<H>  ----------------------------<  76  3.8   |  14<L>  |  1.27    Ca    7.2<L>      16 Sep 2021 11:02    TPro  5.5<L>  /  Alb  3.2<L>  /  TBili  3.0<H>  /  DBili  x   /  AST  33<H>  /  ALT  9   /  AlkPhos  74  09-16                  PT/INR - ( 16 Sep 2021 11:02 )   PT: 14.3 sec;   INR: 1.26 ratio         PTT - ( 16 Sep 2021 11:02 )  PTT:25.6 sec          CAPILLARY BLOOD GLUCOSE    CTAP 9/16  FINDINGS:    Mild bibasilar dependent atelectasis.    There is diffuse pneumoperitoneum. Diffuse portal venous gas identified within the liver. The gallbladder is normally distended.  There are no radioopaque gallstones.  There is no intrahepatic or extra-hepatic biliary ductal dilatation.    The pancreas, spleen, and right adrenal glands are unremarkable. Incompletely characterized left adrenal nodule measuring 2.1 x 1.6 cm. Moderate bilateral hydronephrosis to the level of midline pelvic mass. There is no perinephric stranding or perinephric fluid collection.    Uterus is deviated to the right side. Large complex multiseptated midline pelvic mass extending to the upper abdomen, largest component of the mass measuring 15.9 x 23.7 x 30.5 cm (transverse by AP by craniocaudal dimension), likely left adnexal in origin. Mass contains large pocket of air centrally. Suboptimally visualized rectosigmoid junction with incompletely characterize midline posterior pelvic mass measuring 9.6 x 9.3 cm, possibility of colonic malignancy or fistulous communication to the adnexal mass considered. There is twisting of the small bowel mesentery with suboptimal enhancement of adjacent bowel loops with large collection of air identified in pelvis along with pneumatosis (81-88:2), concerning for perforation with bowel ischemia. Diffuse omental caking and peritoneal metastasis identified. Mild pelvic ascites.    Inadequately distended bladder deviated anteriorly.    No significant abdominal or pelvic lymphadenopathy.  Atherosclerotic disease of the aorta and branches. Mild stenosis of the celiac and SMA axis secondary to atherosclerotic calcification, which otherwise appears otherwise patent. TRAVIS appears attenuated or not visualized.    Multilevel degenerative changes of the spine. Heterogeneous vertebral bodies with sclerotic focus identified within L2 vertebral body. Consider nonemergent correlation with bone scan to exclude osseous metastasis.    IMPRESSION:    Large complex multiseptated midline pelvic mass extending to the upper abdomen, largest component of the mass measuring 15.9 x 23.7 x 30.5 cm (transverse by AP by craniocaudal dimension), likely left adnexal in origin. Mass contains large pocket of air centrally.    Suboptimally visualized rectosigmoid junction with incompletely characterize midline posterior pelvic mass measuring 9.6 x 9.3 cm, possibility of colonic malignancy or fistulous communication to the adnexal mass considered.    Twisting of the small bowel mesentery with suboptimal enhancement of adjacent bowel loops with large collection of air identified in pelvis along with pneumatosis (81-88:2), concerning for perforation with bowel ischemia. Diffuse pneumoperitoneum and portal venous gas. Surgical consultation and short-term imaging follow-up is advised.    Diffuse omental caking and peritoneal metastasis. Mild pelvic ascites.    Additional findings as mentioned above.    These critical results were discussed via telephone at 9/16/2021 5:38 AM by Dr. Garcia of radiology with Dr. Duque, institution read-back verification policy was followed.

## 2021-09-16 NOTE — ED ADULT TRIAGE NOTE - CHIEF COMPLAINT QUOTE
sent in from [place] [facility] for Surgery admission after pt was found to have an abd mass. as per spouse at bedside pt with worsening abd pain for 1 month developed  N/V last night and worsening abd pain. pt presents with Fuentes  IVF ongoing by gravity.

## 2021-09-16 NOTE — ED ADULT NURSE NOTE - ED STAT RN HANDOFF DETAILS 2
Assumed care of pt from previous nurse, Linh HOWARD in rm 16 A, a/o, skin warm and dry, no respiratory distress noted, even and unlabored breathing , abd Assumed care of pt from previous nurse, Linh RN in rm 16 A, a/o, skin warm and dry, no respiratory distress noted, even and unlabored breathing , abd distended, firm. IV access noted on the left a/c, NS infusing. Fuentes to BSD. Pt placed on call bell within reach cardiac monitor. Assumed care of pt from previous nurse, Linh RN in rm 16 A, a/o x 2, skin warm and dry, no respiratory distress noted, even and unlabored breathing , abd distended, firm. IV access noted on the left a/c, NS infusing. Fuentes to BSD. Pt placed on call bell within reach cardiac monitor.

## 2021-09-16 NOTE — ED ADULT NURSE REASSESSMENT NOTE - [facility] ED NURSE REASSESS COMMENT FT1
This float RN received pt in spot 4, transfer from [facility], with abdominal mass, consulted for surgery plan for OR, see emar for tx. noted DTI on buttock. reprot given to OR, pt given ivf, levo and fentanyl. pt given partial does d/t decreased mentation O2 sat, pt placed on oxygen 4lnc, levophed increased. report given to OR LEE Plaza at 1150. surgery/ gyn-onc mds at bedside will bring pt to OR.

## 2021-09-16 NOTE — ED PROVIDER NOTE - OBJECTIVE STATEMENT
abdominal pain, nausea, vomiting, diarrhea starting earlier today  abdominal pain 70 y/o female with abdominal pain x three months, increased pain with nausea and  vomiting this evening, she has diminished appetite, no  weight Loss, she is a former smoker, doesn't go to the doctors and hasn't had a colonoscopy. no fever, no chills, no CP, no SOB, no urinary symptoms, no GIB.

## 2021-09-16 NOTE — ED PROVIDER NOTE - CARE PLAN
1 Principal Discharge DX:	Abdominal mass   Principal Discharge DX:	Abdominal mass  Secondary Diagnosis:	Pelvic mass   Principal Discharge DX:	Abdominal mass  Secondary Diagnosis:	Pelvic mass  Secondary Diagnosis:	Perforation of intestine  Secondary Diagnosis:	Ischemia, bowel  Secondary Diagnosis:	Ischemia, bowel

## 2021-09-16 NOTE — ED PROVIDER NOTE - NSBENEFITOFTRANSFER_ED_A_ED
Obtain Level of Care/Service Not Available at this Facility Worsening of Condition, Death, or Disability if Patient Does Not Transfer

## 2021-09-16 NOTE — BRIEF OPERATIVE NOTE - SPECIMENS
abdominal mass en bloc with small bowel, right adnexa abdominal mass en bloc with small bowel, right adnexa, additional distal small bowel specimen

## 2021-09-16 NOTE — CONSULT NOTE ADULT - SUBJECTIVE AND OBJECTIVE BOX
SICU Consultation Note  =====================================================  HPI:   DONOVAN GENAO  71y  Female [id number]    HPI:  70 y/o P2 postmenopausal woman with no known PMHx transferred from [place] ED after she was found to have perforated viscus in the setting of a large abdominal mass. Patient states that she acutely developed severe abdominal pain yesterday evening, also reports associated nausea and vomiting.  Also reported difficulty urinating. Patient states that she has not seen a doctor in at least a decade, does not have a GYN. Patient initially presented to [facility]'s ED.     In [place] ED, patient had a CT scan that showed free air and a 30 cm complex multiseptated abdominal mass, possibly adnexal in origin with omental caking and peritoneal nodules. Also demonstrating 9 cm posterior pelvic mass. Labs notable for uptrending lactate from 3->6 despite fluid resuscitation. Fuentes catheter was placed. Patient was transferred to [facility] ED for GYN ONC and surgical evaluation.     Patient is now s/p adnexal mass and attached R adnexa removal, as well as small bowel resection. Patient left in discontinuity with plan with [facility] for RTOR tomorrow vs. Saturday. At time of encounter, patient is intubated on AC, on levo and soila.       Name of GYN Physician: None    POB:  x2  PMH: denies  PSH: denies  Meds: none  All: NKDA  SH: +1/2 PPD smoker   (16 Sep 2021 11:15)      Surgery Information  EBL: 500         IV Fluids:   2L crystalloid, 3U pRBC, 500 albumin    UOP:    25 cc      PAST MEDICAL & SURGICAL HISTORY:  No pertinent past medical history    No significant past surgical history      Home Meds: Home Medications:    Allergies: Allergies    No Known Allergies    Intolerances      Soc:   Advanced Directives: Presumed Full Code     ROS:    REVIEW OF SYSTEMS    [ ] A ten-point review of systems was otherwise negative except as noted.  [ ] Due to altered mental status/intubation, subjective information were not able to be obtained from the patient. History was obtained, to the extent possible, from review of the chart and collateral sources of information.      CURRENT MEDICATIONS:   --------------------------------------------------------------------------------------  Neurologic Medications  propofol Infusion 30 MICROgram(s)/kG/Min IV Continuous <Continuous>    Respiratory Medications    Cardiovascular Medications  norepinephrine Infusion 0.05 MICROgram(s)/kG/Min IV Continuous <Continuous>  phenylephrine    Infusion 0.004 MICROgram(s)/kG/Min IV Continuous <Continuous>    Gastrointestinal Medications  lactated ringers. 1000 milliLiter(s) IV Continuous <Continuous>  sodium chloride 0.9% Bolus 1000 milliLiter(s) IV Bolus once  sodium chloride 0.9% lock flush 10 milliLiter(s) IV Push every 1 hour PRN Pre/post blood products, medications, blood draw, and to maintain line patency    Genitourinary Medications    Hematologic/Oncologic Medications  heparin   Injectable 5000 Unit(s) SubCutaneous every 8 hours    Antimicrobial/Immunologic Medications    Endocrine/Metabolic Medications    Topical/Other Medications  chlorhexidine 0.12% Liquid 15 milliLiter(s) Oral Mucosa every 12 hours  chlorhexidine 4% Liquid 1 Application(s) Topical <User Schedule>    --------------------------------------------------------------------------------------    VITAL SIGNS, INS/OUTS (last 24 hours):  --------------------------------------------------------------------------------------  ICU Vital Signs Last 24 Hrs  T(C): 36.4 (16 Sep 2021 16:30), Max: 38.1 (16 Sep 2021 10:49)  T(F): 97.5 (16 Sep 2021 16:30), Max: 100.5 (16 Sep 2021 10:49)  HR: 99 (16 Sep 2021 17:15) (81 - 140)  BP: 122/77 (16 Sep 2021 17:15) (68/47 - 133/98)  BP(mean): 92 (16 Sep 2021 17:15) (78 - 101)  ABP: 124/68 (16 Sep 2021 17:15) (103/60 - 124/68)  ABP(mean): 88 (16 Sep 2021 17:15) (77 - 88)  RR: 15 (16 Sep 2021 17:15) (14 - 28)  SpO2: 100% (16 Sep 2021 17:00) (73% - 100%)    I&O's Summary    16 Sep 2021 07:01  -  16 Sep 2021 17:23  --------------------------------------------------------  IN: 75 mL / OUT: 45 mL / NET: 30 mL      --------------------------------------------------------------------------------------    EXAM:  General/Neuro  RASS: 0  GCS: intubated  Exam: Resting comfortably, intubated, sedated on prop    Respiratory  Exam: Normal expansion/effort.     Cardiovascular  Exam: S1, S2.  Regular rate and rhythm.     Cardiac Rhythm: Normal Sinus Rhythm      GI  Exam: Abdomen soft, abthera vac in place holding suction      Tubes/Lines/Drains  ***  [x] Peripheral IV  [] Central Venous Line     	[] R	[] L	[] IJ	[] Fem	[] SC        Type:	    Date Placed:   [] Arterial Line		[] R	[] L	[] Fem	[] Rad	[] Ax	Date Placed:   [] PICC:         	[] Midline		[] Mediport           [] Urinary Catheter		Date Placed:     Extremities  Exam: Extremities warm, pink, well-perfused.        Derm:  Exam: Good skin turgor, no skin breakdown.      :   Exam: Fuentes catheter in place.     LABS  --------------------------------------------------------------------------------------  Labs:  CAPILLARY BLOOD GLUCOSE                              8.1    3.13  )-----------( 316      ( 16 Sep 2021 11:02 )             27.3       Auto Neutrophil %: 33.6 % (21 @ 11:02)  Band Neutrophils %: 33.6 % (21 @ 11:02)        137  |  106  |  24<H>  ----------------------------<  76  3.8   |  14<L>  |  1.27      Calcium, Total Serum: 7.2 mg/dL (21 @ 11:02)      LFTs:             5.5  | 3.0  | 33       ------------------[74      ( 16 Sep 2021 11:02 )  3.2  | x    | 9           Lipase:x      Amylase:x         Blood Gas Arterial, Lactate: 2.1 mmol/L (21 @ 16:13)  Blood Gas Arterial, Lactate: 2.5 mmol/L (21 @ 15:09)  Blood Gas Arterial, Lactate: 1.9 mmol/L (21 @ 14:12)  Blood Gas Arterial, Lactate: 1.8 mmol/L (21 @ 13:37)  Blood Gas Arterial, Lactate: 1.6 mmol/L (21 @ 12:51)  Blood Gas Venous - Lactate: 3.9 mmol/L (21 @ 11:02)  Lactate, Blood: 5.9 mmol/L (21 @ 09:08)  Lactate, Blood: 3.1 mmol/L (09-16-21 @ 04:51)    ABG - ( 16 Sep 2021 16:13 )  pH: 7.27  /  pCO2: 35    /  pO2: 300   / HCO3: 16    / Base Excess: -9.9  /  SaO2: 99.4            ABG - ( 16 Sep 2021 15:09 )  pH: 7.29  /  pCO2: 36    /  pO2: 258   / HCO3: 17    / Base Excess: -8.5  /  SaO2: 100.0           ABG - ( 16 Sep 2021 14:12 )  pH: 7.28  /  pCO2: 35    /  pO2: 244   / HCO3: 16    / Base Excess: -9.5  /  SaO2: 99.3              Coags:     14.3   ----< 1.26    ( 16 Sep 2021 11:02 )     25.6                Urinalysis Basic - ( 16 Sep 2021 07:03 )    Color: Yellow / Appearance: Slightly Turbid / S.010 / pH: x  Gluc: x / Ketone: Trace  / Bili: Negative / Urobili: Negative   Blood: x / Protein: 15 / Nitrite: Negative   Leuk Esterase: Trace / RBC: 0-2 /HPF / WBC 0-2   Sq Epi: x / Non Sq Epi: Occasional / Bacteria: Few          --------------------------------------------------------------------------------------    OTHER LABS    IMAGING RESULTS      ASSESSMENT:  71y Female s/p adnexal mass and R adnexa rsetion, and SBR, left in discontinuity with plan for RTOR for adnexal mass.     PLAN:   Neurologic:   - sedated and intubated  - propofol  - pain control    Respiratory:   - intubated  - AC settings: 15/  Cardiovascular:   Gastrointestinal/Nutrition:   Renal/Genitourinary:   Hematologic:   Infectious Disease:   Lines/Tubes:  Endocrine:   Disposition:     --------------------------------------------------------------------------------------    Critical Care Diagnoses:   SICU Consultation Note  =====================================================  HPI:   DONOVAN GENAO  71y  Female [id number]    HPI:  70 y/o P2 postmenopausal woman with no known PMHx transferred from [place] ED after she was found to have perforated viscus in the setting of a large abdominal mass. Patient states that she acutely developed severe abdominal pain yesterday evening, also reports associated nausea and vomiting.  Also reported difficulty urinating. Patient states that she has not seen a doctor in at least a decade, does not have a GYN. Patient initially presented to [facility]'s ED.     In [place] ED, patient had a CT scan that showed free air and a 30 cm complex multiseptated abdominal mass, possibly adnexal in origin with omental caking and peritoneal nodules. Also demonstrating 9 cm posterior pelvic mass. Labs notable for uptrending lactate from 3->6 despite fluid resuscitation. Echavarria catheter was placed. Patient was transferred to [facility] ED for GYN ONC and surgical evaluation.     Patient is now s/p adnexal mass and attached R adnexa removal, as well as small bowel resection. Patient left in discontinuity with plan with [facility] for RTOR tomorrow vs. Saturday. At time of encounter, patient is intubated on AC, on levo and soila.       Name of GYN Physician: None    POB:  x2  PMH: denies  PSH: denies  Meds: none  All: NKDA  SH: +1/2 PPD smoker   (16 Sep 2021 11:15)      Surgery Information  EBL: 500         IV Fluids:   2L crystalloid, 3U pRBC, 500 albumin    UOP:    25 cc      PAST MEDICAL & SURGICAL HISTORY:  No pertinent past medical history    No significant past surgical history      Home Meds: Home Medications:    Allergies: Allergies    No Known Allergies    Intolerances      Soc:   Advanced Directives: Presumed Full Code     ROS:    REVIEW OF SYSTEMS    [ ] A ten-point review of systems was otherwise negative except as noted.  [ ] Due to altered mental status/intubation, subjective information were not able to be obtained from the patient. History was obtained, to the extent possible, from review of the chart and collateral sources of information.      CURRENT MEDICATIONS:   --------------------------------------------------------------------------------------  Neurologic Medications  propofol Infusion 30 MICROgram(s)/kG/Min IV Continuous <Continuous>    Respiratory Medications    Cardiovascular Medications  norepinephrine Infusion 0.05 MICROgram(s)/kG/Min IV Continuous <Continuous>  phenylephrine    Infusion 0.004 MICROgram(s)/kG/Min IV Continuous <Continuous>    Gastrointestinal Medications  lactated ringers. 1000 milliLiter(s) IV Continuous <Continuous>  sodium chloride 0.9% Bolus 1000 milliLiter(s) IV Bolus once  sodium chloride 0.9% lock flush 10 milliLiter(s) IV Push every 1 hour PRN Pre/post blood products, medications, blood draw, and to maintain line patency    Genitourinary Medications    Hematologic/Oncologic Medications  heparin   Injectable 5000 Unit(s) SubCutaneous every 8 hours    Antimicrobial/Immunologic Medications    Endocrine/Metabolic Medications    Topical/Other Medications  chlorhexidine 0.12% Liquid 15 milliLiter(s) Oral Mucosa every 12 hours  chlorhexidine 4% Liquid 1 Application(s) Topical <User Schedule>    --------------------------------------------------------------------------------------    VITAL SIGNS, INS/OUTS (last 24 hours):  --------------------------------------------------------------------------------------  ICU Vital Signs Last 24 Hrs  T(C): 36.4 (16 Sep 2021 16:30), Max: 38.1 (16 Sep 2021 10:49)  T(F): 97.5 (16 Sep 2021 16:30), Max: 100.5 (16 Sep 2021 10:49)  HR: 99 (16 Sep 2021 17:15) (81 - 140)  BP: 122/77 (16 Sep 2021 17:15) (68/47 - 133/98)  BP(mean): 92 (16 Sep 2021 17:15) (78 - 101)  ABP: 124/68 (16 Sep 2021 17:15) (103/60 - 124/68)  ABP(mean): 88 (16 Sep 2021 17:15) (77 - 88)  RR: 15 (16 Sep 2021 17:15) (14 - 28)  SpO2: 100% (16 Sep 2021 17:00) (73% - 100%)    I&O's Summary    16 Sep 2021 07:01  -  16 Sep 2021 17:23  --------------------------------------------------------  IN: 75 mL / OUT: 45 mL / NET: 30 mL      --------------------------------------------------------------------------------------    EXAM:  General/Neuro  RASS: 0  GCS: intubated  Exam: Resting comfortably, intubated, sedated on prop    Respiratory  Exam: Normal expansion/effort.     Cardiovascular  Exam: S1, S2.  Regular rate and rhythm.     Cardiac Rhythm: Normal Sinus Rhythm      GI  Exam: Abdomen soft, abthera vac in place holding suction      Tubes/Lines/Drains  ***  [x] Peripheral IV  [] Central Venous Line     	[] R	[] L	[] IJ	[] Fem	[] SC        Type:	    Date Placed:   [] Arterial Line		[] R	[] L	[] Fem	[] Rad	[] Ax	Date Placed:   [] PICC:         	[] Midline		[] Mediport           [] Urinary Catheter		Date Placed:     Extremities  Exam: Extremities warm, pink, well-perfused.        Derm:  Exam: Good skin turgor, no skin breakdown.      :   Exam: Echavarria catheter in place.     LABS  --------------------------------------------------------------------------------------  Labs:  CAPILLARY BLOOD GLUCOSE                              8.1    3.13  )-----------( 316      ( 16 Sep 2021 11:02 )             27.3       Auto Neutrophil %: 33.6 % (21 @ 11:02)  Band Neutrophils %: 33.6 % (21 @ 11:02)        137  |  106  |  24<H>  ----------------------------<  76  3.8   |  14<L>  |  1.27      Calcium, Total Serum: 7.2 mg/dL (21 @ 11:02)      LFTs:             5.5  | 3.0  | 33       ------------------[74      ( 16 Sep 2021 11:02 )  3.2  | x    | 9           Lipase:x      Amylase:x         Blood Gas Arterial, Lactate: 2.1 mmol/L (21 @ 16:13)  Blood Gas Arterial, Lactate: 2.5 mmol/L (21 @ 15:09)  Blood Gas Arterial, Lactate: 1.9 mmol/L (21 @ 14:12)  Blood Gas Arterial, Lactate: 1.8 mmol/L (21 @ 13:37)  Blood Gas Arterial, Lactate: 1.6 mmol/L (21 @ 12:51)  Blood Gas Venous - Lactate: 3.9 mmol/L (21 @ 11:02)  Lactate, Blood: 5.9 mmol/L (21 @ 09:08)  Lactate, Blood: 3.1 mmol/L (09-16-21 @ 04:51)    ABG - ( 16 Sep 2021 16:13 )  pH: 7.27  /  pCO2: 35    /  pO2: 300   / HCO3: 16    / Base Excess: -9.9  /  SaO2: 99.4            ABG - ( 16 Sep 2021 15:09 )  pH: 7.29  /  pCO2: 36    /  pO2: 258   / HCO3: 17    / Base Excess: -8.5  /  SaO2: 100.0           ABG - ( 16 Sep 2021 14:12 )  pH: 7.28  /  pCO2: 35    /  pO2: 244   / HCO3: 16    / Base Excess: -9.5  /  SaO2: 99.3              Coags:     14.3   ----< 1.26    ( 16 Sep 2021 11:02 )     25.6                Urinalysis Basic - ( 16 Sep 2021 07:03 )    Color: Yellow / Appearance: Slightly Turbid / S.010 / pH: x  Gluc: x / Ketone: Trace  / Bili: Negative / Urobili: Negative   Blood: x / Protein: 15 / Nitrite: Negative   Leuk Esterase: Trace / RBC: 0-2 /HPF / WBC 0-2   Sq Epi: x / Non Sq Epi: Occasional / Bacteria: Few          --------------------------------------------------------------------------------------    OTHER LABS    IMAGING RESULTS      ASSESSMENT:  71y Female s/p adnexal mass and R adnexa rsetion, and SBR, left in discontinuity with plan for RTOR for adnexal mass.     PLAN:   Neurologic:   - sedated and intubated  - propofol  - pain control    Respiratory:   - intubated  - AC settings: 15/430/5/50  - monitor SpO2    Cardiovascular:   - on levo .05, soila .21 and vaso .03  - MAP >65 goal      Gastrointestinal/Nutrition:   - NPO  - in discontinuity plan for RTOR    Renal/Genitourinary:   - echavarria in place  - monitor UOP and electrolytes  Hematologic:   Infectious Disease:   Lines/Tubes:  Endocrine:   Disposition:     --------------------------------------------------------------------------------------    Critical Care Diagnoses:   SICU Consultation Note  =====================================================  HPI:   DONOVAN GENAO  71y  Female [id number]    HPI:  70 y/o P2 postmenopausal woman with no known PMHx transferred from [place] ED after she was found to have perforated viscus in the setting of a large abdominal mass. Patient states that she acutely developed severe abdominal pain yesterday evening, also reports associated nausea and vomiting.  Also reported difficulty urinating. Patient states that she has not seen a doctor in at least a decade, does not have a GYN. Patient initially presented to [facility]'s ED.     In [place] ED, patient had a CT scan that showed free air and a 30 cm complex multiseptated abdominal mass, possibly adnexal in origin with omental caking and peritoneal nodules. Also demonstrating 9 cm posterior pelvic mass. Labs notable for uptrending lactate from 3->6 despite fluid resuscitation. Echavarria catheter was placed. Patient was transferred to [facility] ED for GYN ONC and surgical evaluation.     Patient is now s/p adnexal mass and attached R adnexa removal, as well as small bowel resection. Patient left in discontinuity with plan with [facility] for RTOR tomorrow vs. Saturday. At time of encounter, patient is intubated on AC, on levo and soila.       Name of GYN Physician: None    POB:  x2  PMH: denies  PSH: denies  Meds: none  All: NKDA  SH: +1/2 PPD smoker   (16 Sep 2021 11:15)      Surgery Information  EBL: 500         IV Fluids:   2L crystalloid, 3U pRBC, 500 albumin    UOP:    25 cc      PAST MEDICAL & SURGICAL HISTORY:  No pertinent past medical history    No significant past surgical history      Home Meds: Home Medications:    Allergies: Allergies    No Known Allergies    Intolerances      Soc:   Advanced Directives: Presumed Full Code     ROS:    REVIEW OF SYSTEMS    [ ] A ten-point review of systems was otherwise negative except as noted.  [ ] Due to altered mental status/intubation, subjective information were not able to be obtained from the patient. History was obtained, to the extent possible, from review of the chart and collateral sources of information.      CURRENT MEDICATIONS:   --------------------------------------------------------------------------------------  Neurologic Medications  propofol Infusion 30 MICROgram(s)/kG/Min IV Continuous <Continuous>    Respiratory Medications    Cardiovascular Medications  norepinephrine Infusion 0.05 MICROgram(s)/kG/Min IV Continuous <Continuous>  phenylephrine    Infusion 0.004 MICROgram(s)/kG/Min IV Continuous <Continuous>    Gastrointestinal Medications  lactated ringers. 1000 milliLiter(s) IV Continuous <Continuous>  sodium chloride 0.9% Bolus 1000 milliLiter(s) IV Bolus once  sodium chloride 0.9% lock flush 10 milliLiter(s) IV Push every 1 hour PRN Pre/post blood products, medications, blood draw, and to maintain line patency    Genitourinary Medications    Hematologic/Oncologic Medications  heparin   Injectable 5000 Unit(s) SubCutaneous every 8 hours    Antimicrobial/Immunologic Medications    Endocrine/Metabolic Medications    Topical/Other Medications  chlorhexidine 0.12% Liquid 15 milliLiter(s) Oral Mucosa every 12 hours  chlorhexidine 4% Liquid 1 Application(s) Topical <User Schedule>    --------------------------------------------------------------------------------------    VITAL SIGNS, INS/OUTS (last 24 hours):  --------------------------------------------------------------------------------------  ICU Vital Signs Last 24 Hrs  T(C): 36.4 (16 Sep 2021 16:30), Max: 38.1 (16 Sep 2021 10:49)  T(F): 97.5 (16 Sep 2021 16:30), Max: 100.5 (16 Sep 2021 10:49)  HR: 99 (16 Sep 2021 17:15) (81 - 140)  BP: 122/77 (16 Sep 2021 17:15) (68/47 - 133/98)  BP(mean): 92 (16 Sep 2021 17:15) (78 - 101)  ABP: 124/68 (16 Sep 2021 17:15) (103/60 - 124/68)  ABP(mean): 88 (16 Sep 2021 17:15) (77 - 88)  RR: 15 (16 Sep 2021 17:15) (14 - 28)  SpO2: 100% (16 Sep 2021 17:00) (73% - 100%)    I&O's Summary    16 Sep 2021 07:01  -  16 Sep 2021 17:23  --------------------------------------------------------  IN: 75 mL / OUT: 45 mL / NET: 30 mL      --------------------------------------------------------------------------------------    EXAM:  General/Neuro  RASS: 0  GCS: intubated  Exam: Resting comfortably, intubated, sedated on prop    Respiratory  Exam: Normal expansion/effort.     Cardiovascular  Exam: S1, S2.  Regular rate and rhythm.     Cardiac Rhythm: Normal Sinus Rhythm      GI  Exam: Abdomen soft, abthera vac in place holding suction      Tubes/Lines/Drains  ***  [x] Peripheral IV  [] Central Venous Line     	[] R	[] L	[] IJ	[] Fem	[] SC        Type:	    Date Placed:   [] Arterial Line		[] R	[] L	[] Fem	[] Rad	[] Ax	Date Placed:   [] PICC:         	[] Midline		[] Mediport           [] Urinary Catheter		Date Placed:     Extremities  Exam: Extremities warm, pink, well-perfused.        Derm:  Exam: Good skin turgor, no skin breakdown.      :   Exam: Echavarria catheter in place.     LABS  --------------------------------------------------------------------------------------  Labs:  CAPILLARY BLOOD GLUCOSE                              8.1    3.13  )-----------( 316      ( 16 Sep 2021 11:02 )             27.3       Auto Neutrophil %: 33.6 % (21 @ 11:02)  Band Neutrophils %: 33.6 % (21 @ 11:02)        137  |  106  |  24<H>  ----------------------------<  76  3.8   |  14<L>  |  1.27      Calcium, Total Serum: 7.2 mg/dL (21 @ 11:02)      LFTs:             5.5  | 3.0  | 33       ------------------[74      ( 16 Sep 2021 11:02 )  3.2  | x    | 9           Lipase:x      Amylase:x         Blood Gas Arterial, Lactate: 2.1 mmol/L (21 @ 16:13)  Blood Gas Arterial, Lactate: 2.5 mmol/L (21 @ 15:09)  Blood Gas Arterial, Lactate: 1.9 mmol/L (21 @ 14:12)  Blood Gas Arterial, Lactate: 1.8 mmol/L (21 @ 13:37)  Blood Gas Arterial, Lactate: 1.6 mmol/L (21 @ 12:51)  Blood Gas Venous - Lactate: 3.9 mmol/L (21 @ 11:02)  Lactate, Blood: 5.9 mmol/L (21 @ 09:08)  Lactate, Blood: 3.1 mmol/L (09-16-21 @ 04:51)    ABG - ( 16 Sep 2021 16:13 )  pH: 7.27  /  pCO2: 35    /  pO2: 300   / HCO3: 16    / Base Excess: -9.9  /  SaO2: 99.4            ABG - ( 16 Sep 2021 15:09 )  pH: 7.29  /  pCO2: 36    /  pO2: 258   / HCO3: 17    / Base Excess: -8.5  /  SaO2: 100.0           ABG - ( 16 Sep 2021 14:12 )  pH: 7.28  /  pCO2: 35    /  pO2: 244   / HCO3: 16    / Base Excess: -9.5  /  SaO2: 99.3              Coags:     14.3   ----< 1.26    ( 16 Sep 2021 11:02 )     25.6                Urinalysis Basic - ( 16 Sep 2021 07:03 )    Color: Yellow / Appearance: Slightly Turbid / S.010 / pH: x  Gluc: x / Ketone: Trace  / Bili: Negative / Urobili: Negative   Blood: x / Protein: 15 / Nitrite: Negative   Leuk Esterase: Trace / RBC: 0-2 /HPF / WBC 0-2   Sq Epi: x / Non Sq Epi: Occasional / Bacteria: Few          --------------------------------------------------------------------------------------    OTHER LABS    IMAGING RESULTS      ASSESSMENT:  71y Female s/p adnexal mass and R adnexa rsetion, and SBR, left in discontinuity with plan for RTOR for adnexal mass.     PLAN:   Neurologic:   - sedated and intubated  - propofol, fentanyl   - pain control    Respiratory:   - intubated  - AC settings: 15/430/5/50  - monitor SpO2    Cardiovascular:   - on levo .05, soila .21 and vaso .03  - MAP >65 goal  - requires POCUS  - flowtrac  - resuscitate PRN     Gastrointestinal/Nutrition:   - NPO  - in discontinuity plan for RTOR    Renal/Genitourinary:   - echavarria in place  - monitor UOP and electrolytes    Hematologic:   - monitor H/H  - on pressors  - wean off pressors    Infectious Disease:   -zosyn per primary team  - afebrile  - monitor WBC and fever curve    Lines/Tubes:  - echavarria  - L radial A line  - CVC    Endocrine:   -monitor sugars on BMP    Disposition:   SICU  --------------------------------------------------------------------------------------    Critical Care Diagnoses:   SICU Consultation Note  =====================================================  HPI:   DONOVAN GENAO  71y  Female [id number]    HPI:  72 y/o P2 postmenopausal woman with no known PMHx transferred from [place] ED after she was found to have perforated viscus in the setting of a large abdominal mass. Patient states that she acutely developed severe abdominal pain yesterday evening, also reports associated nausea and vomiting.  Also reported difficulty urinating. Patient states that she has not seen a doctor in at least a decade, does not have a GYN. Patient initially presented to [facility]'s ED.     In [place] ED, patient had a CT scan that showed free air and a 30 cm complex multiseptated abdominal mass, possibly adnexal in origin with omental caking and peritoneal nodules. Also demonstrating 9 cm posterior pelvic mass. Labs notable for uptrending lactate from 3->6 despite fluid resuscitation. Echavarria catheter was placed. Patient was transferred to [facility] ED for GYN ONC and surgical evaluation.     Patient is now s/p adnexal mass and attached R adnexa removal, as well as small bowel resection. Patient left in discontinuity with plan with [facility] for RTOR tomorrow vs. Saturday. At time of encounter, patient is intubated on AC, on levo and soila.       Name of GYN Physician: None    POB:  x2  PMH: denies  PSH: denies  Meds: none  All: NKDA  SH: +1/2 PPD smoker   (16 Sep 2021 11:15)      Surgery Information  EBL: 500, 1 L ascites        IV Fluids:   2L crystalloid, 3U pRBC, 500 albumin    UOP:    25 cc      PAST MEDICAL & SURGICAL HISTORY:  No pertinent past medical history    No significant past surgical history      Home Meds: Home Medications:    Allergies: Allergies    No Known Allergies    Intolerances      Soc:   Advanced Directives: Presumed Full Code     ROS:    REVIEW OF SYSTEMS    [ ] A ten-point review of systems was otherwise negative except as noted.  [ ] Due to altered mental status/intubation, subjective information were not able to be obtained from the patient. History was obtained, to the extent possible, from review of the chart and collateral sources of information.      CURRENT MEDICATIONS:   --------------------------------------------------------------------------------------  Neurologic Medications  propofol Infusion 30 MICROgram(s)/kG/Min IV Continuous <Continuous>    Respiratory Medications    Cardiovascular Medications  norepinephrine Infusion 0.05 MICROgram(s)/kG/Min IV Continuous <Continuous>  phenylephrine    Infusion 0.004 MICROgram(s)/kG/Min IV Continuous <Continuous>    Gastrointestinal Medications  lactated ringers. 1000 milliLiter(s) IV Continuous <Continuous>  sodium chloride 0.9% Bolus 1000 milliLiter(s) IV Bolus once  sodium chloride 0.9% lock flush 10 milliLiter(s) IV Push every 1 hour PRN Pre/post blood products, medications, blood draw, and to maintain line patency    Genitourinary Medications    Hematologic/Oncologic Medications  heparin   Injectable 5000 Unit(s) SubCutaneous every 8 hours    Antimicrobial/Immunologic Medications    Endocrine/Metabolic Medications    Topical/Other Medications  chlorhexidine 0.12% Liquid 15 milliLiter(s) Oral Mucosa every 12 hours  chlorhexidine 4% Liquid 1 Application(s) Topical <User Schedule>    --------------------------------------------------------------------------------------    VITAL SIGNS, INS/OUTS (last 24 hours):  --------------------------------------------------------------------------------------  ICU Vital Signs Last 24 Hrs  T(C): 36.4 (16 Sep 2021 16:30), Max: 38.1 (16 Sep 2021 10:49)  T(F): 97.5 (16 Sep 2021 16:30), Max: 100.5 (16 Sep 2021 10:49)  HR: 99 (16 Sep 2021 17:15) (81 - 140)  BP: 122/77 (16 Sep 2021 17:15) (68/47 - 133/98)  BP(mean): 92 (16 Sep 2021 17:15) (78 - 101)  ABP: 124/68 (16 Sep 2021 17:15) (103/60 - 124/68)  ABP(mean): 88 (16 Sep 2021 17:15) (77 - 88)  RR: 15 (16 Sep 2021 17:15) (14 - 28)  SpO2: 100% (16 Sep 2021 17:00) (73% - 100%)    I&O's Summary    16 Sep 2021 07:01  -  16 Sep 2021 17:23  --------------------------------------------------------  IN: 75 mL / OUT: 45 mL / NET: 30 mL      --------------------------------------------------------------------------------------    EXAM:  General/Neuro  RASS: 0  GCS: intubated  Exam: Resting comfortably, intubated, sedated on prop    Respiratory  Exam: Normal expansion/effort.     Cardiovascular  Exam: S1, S2.  Regular rate and rhythm.     Cardiac Rhythm: Normal Sinus Rhythm      GI  Exam: Abdomen soft, abthera vac in place holding suction      Tubes/Lines/Drains  ***  [x] Peripheral IV  [] Central Venous Line     	[] R	[] L	[] IJ	[] Fem	[] SC        Type:	    Date Placed:   [] Arterial Line		[] R	[] L	[] Fem	[] Rad	[] Ax	Date Placed:   [] PICC:         	[] Midline		[] Mediport           [] Urinary Catheter		Date Placed:     Extremities  Exam: Extremities warm, pink, well-perfused.        Derm:  Exam: Good skin turgor, no skin breakdown.      :   Exam: Echavarria catheter in place.     LABS  --------------------------------------------------------------------------------------  Labs:  CAPILLARY BLOOD GLUCOSE                              8.1    3.13  )-----------( 316      ( 16 Sep 2021 11:02 )             27.3       Auto Neutrophil %: 33.6 % (21 @ 11:02)  Band Neutrophils %: 33.6 % (21 @ 11:02)        137  |  106  |  24<H>  ----------------------------<  76  3.8   |  14<L>  |  1.27      Calcium, Total Serum: 7.2 mg/dL (21 @ 11:02)      LFTs:             5.5  | 3.0  | 33       ------------------[74      ( 16 Sep 2021 11:02 )  3.2  | x    | 9           Lipase:x      Amylase:x         Blood Gas Arterial, Lactate: 2.1 mmol/L (21 @ 16:13)  Blood Gas Arterial, Lactate: 2.5 mmol/L (21 @ 15:09)  Blood Gas Arterial, Lactate: 1.9 mmol/L (21 @ 14:12)  Blood Gas Arterial, Lactate: 1.8 mmol/L (21 @ 13:37)  Blood Gas Arterial, Lactate: 1.6 mmol/L (21 @ 12:51)  Blood Gas Venous - Lactate: 3.9 mmol/L (21 @ 11:02)  Lactate, Blood: 5.9 mmol/L (21 @ 09:08)  Lactate, Blood: 3.1 mmol/L (21 @ 04:51)    ABG - ( 16 Sep 2021 16:13 )  pH: 7.27  /  pCO2: 35    /  pO2: 300   / HCO3: 16    / Base Excess: -9.9  /  SaO2: 99.4            ABG - ( 16 Sep 2021 15:09 )  pH: 7.29  /  pCO2: 36    /  pO2: 258   / HCO3: 17    / Base Excess: -8.5  /  SaO2: 100.0           ABG - ( 16 Sep 2021 14:12 )  pH: 7.28  /  pCO2: 35    /  pO2: 244   / HCO3: 16    / Base Excess: -9.5  /  SaO2: 99.3              Coags:     14.3   ----< 1.26    ( 16 Sep 2021 11:02 )     25.6                Urinalysis Basic - ( 16 Sep 2021 07:03 )    Color: Yellow / Appearance: Slightly Turbid / S.010 / pH: x  Gluc: x / Ketone: Trace  / Bili: Negative / Urobili: Negative   Blood: x / Protein: 15 / Nitrite: Negative   Leuk Esterase: Trace / RBC: 0-2 /HPF / WBC 0-2   Sq Epi: x / Non Sq Epi: Occasional / Bacteria: Few          --------------------------------------------------------------------------------------    OTHER LABS    IMAGING RESULTS      ASSESSMENT:  71y Female s/p adnexal mass and R adnexa rsetion, and SBR, left in discontinuity with plan for RTOR for adnexal mass.     PLAN:   Neurologic:   - sedated and intubated  - propofol, fentanyl   - pain control    Respiratory:   - intubated  - AC settings: 15/430/5/50  - monitor SpO2    Cardiovascular:   - on levo .05, soila .21 and vaso .03  - MAP >65 goal  - requires POCUS  - flowtrac  - resuscitate PRN     Gastrointestinal/Nutrition:   - NPO  - in discontinuity plan for RTOR    Renal/Genitourinary:   - echavarria in place  - monitor UOP and electrolytes    Hematologic:   - monitor H/H  - on pressors  - wean off pressors    Infectious Disease:   -zosyn per primary team  - afebrile  - monitor WBC and fever curve    Lines/Tubes:  - echavarria  - L radial A line  - CVC    Endocrine:   -monitor sugars on BMP    Disposition:   SICU  --------------------------------------------------------------------------------------    Critical Care Diagnoses:   SICU Consultation Note  =====================================================  HPI:   DONOVAN GENAO  71y  Female [id number]    HPI:  72 y/o P2 postmenopausal woman with no known PMHx transferred from [place] ED after she was found to have perforated viscus in the setting of a large abdominal mass. Patient states that she acutely developed severe abdominal pain yesterday evening, also reports associated nausea and vomiting.  Also reported difficulty urinating. Patient states that she has not seen a doctor in at least a decade, does not have a GYN. Patient initially presented to [facility]'s ED.     In [place] ED, patient had a CT scan that showed free air and a 30 cm complex multiseptated abdominal mass, possibly adnexal in origin with omental caking and peritoneal nodules. Also demonstrating 9 cm posterior pelvic mass. Labs notable for uptrending lactate from 3->6 despite fluid resuscitation. Echavarria catheter was placed. Patient was transferred to [facility] ED for GYN ONC and surgical evaluation.     Patient is now s/p adnexal mass and attached R adnexa removal, as well as small bowel resection. Patient left in discontinuity with plan with [facility] for RTOR tomorrow vs. Saturday. At time of encounter, patient is intubated on AC, on levo and soila.       Name of GYN Physician: None    POB:  x2  PMH: denies  PSH: denies  Meds: none  All: NKDA  SH: +1/2 PPD smoker   (16 Sep 2021 11:15)      Surgery Information  EBL: 500, 1 L ascites        IV Fluids:   2L crystalloid, 3U pRBC, 500 albumin       PAST MEDICAL & SURGICAL HISTORY:  No pertinent past medical history    No significant past surgical history      Home Meds: Home Medications:    Allergies: Allergies    No Known Allergies    Intolerances      Soc:   Advanced Directives: Presumed Full Code     ROS:    REVIEW OF SYSTEMS    [ ] A ten-point review of systems was otherwise negative except as noted.  [ ] Due to altered mental status/intubation, subjective information were not able to be obtained from the patient. History was obtained, to the extent possible, from review of the chart and collateral sources of information.      CURRENT MEDICATIONS:   --------------------------------------------------------------------------------------  Neurologic Medications  propofol Infusion 30 MICROgram(s)/kG/Min IV Continuous <Continuous>    Respiratory Medications    Cardiovascular Medications  norepinephrine Infusion 0.05 MICROgram(s)/kG/Min IV Continuous <Continuous>  phenylephrine    Infusion 0.004 MICROgram(s)/kG/Min IV Continuous <Continuous>    Gastrointestinal Medications  lactated ringers. 1000 milliLiter(s) IV Continuous <Continuous>  sodium chloride 0.9% Bolus 1000 milliLiter(s) IV Bolus once  sodium chloride 0.9% lock flush 10 milliLiter(s) IV Push every 1 hour PRN Pre/post blood products, medications, blood draw, and to maintain line patency    Genitourinary Medications    Hematologic/Oncologic Medications  heparin   Injectable 5000 Unit(s) SubCutaneous every 8 hours    Antimicrobial/Immunologic Medications    Endocrine/Metabolic Medications    Topical/Other Medications  chlorhexidine 0.12% Liquid 15 milliLiter(s) Oral Mucosa every 12 hours  chlorhexidine 4% Liquid 1 Application(s) Topical <User Schedule>    --------------------------------------------------------------------------------------    VITAL SIGNS, INS/OUTS (last 24 hours):  --------------------------------------------------------------------------------------  ICU Vital Signs Last 24 Hrs  T(C): 36.4 (16 Sep 2021 16:30), Max: 38.1 (16 Sep 2021 10:49)  T(F): 97.5 (16 Sep 2021 16:30), Max: 100.5 (16 Sep 2021 10:49)  HR: 99 (16 Sep 2021 17:15) (81 - 140)  BP: 122/77 (16 Sep 2021 17:15) (68/47 - 133/98)  BP(mean): 92 (16 Sep 2021 17:15) (78 - 101)  ABP: 124/68 (16 Sep 2021 17:15) (103/60 - 124/68)  ABP(mean): 88 (16 Sep 2021 17:15) (77 - 88)  RR: 15 (16 Sep 2021 17:15) (14 - 28)  SpO2: 100% (16 Sep 2021 17:00) (73% - 100%)    I&O's Summary    16 Sep 2021 07:01  -  16 Sep 2021 17:23  --------------------------------------------------------  IN: 75 mL / OUT: 45 mL / NET: 30 mL      --------------------------------------------------------------------------------------    EXAM:  General/Neuro  RASS: 0  GCS: intubated  Exam: Resting comfortably, intubated, sedated on prop    Respiratory  Exam: Normal expansion/effort.     Cardiovascular  Exam: S1, S2.  Regular rate and rhythm.     Cardiac Rhythm: Normal Sinus Rhythm      GI  Exam: Abdomen soft, abthera vac in place holding suction      Tubes/Lines/Drains  ***  [x] Peripheral IV  [] Central Venous Line     	[] R	[] L	[] IJ	[] Fem	[] SC        Type:	    Date Placed:   [] Arterial Line		[] R	[] L	[] Fem	[] Rad	[] Ax	Date Placed:   [] PICC:         	[] Midline		[] Mediport           [] Urinary Catheter		Date Placed:     Extremities  Exam: Extremities warm, pink, well-perfused.        Derm:  Exam: Good skin turgor, no skin breakdown.      :   Exam: Echavarria catheter in place.     LABS  --------------------------------------------------------------------------------------  Labs:  CAPILLARY BLOOD GLUCOSE                              8.1    3.13  )-----------( 316      ( 16 Sep 2021 11:02 )             27.3       Auto Neutrophil %: 33.6 % (21 @ 11:02)  Band Neutrophils %: 33.6 % (21 @ 11:02)        137  |  106  |  24<H>  ----------------------------<  76  3.8   |  14<L>  |  1.27      Calcium, Total Serum: 7.2 mg/dL (21 @ 11:02)      LFTs:             5.5  | 3.0  | 33       ------------------[74      ( 16 Sep 2021 11:02 )  3.2  | x    | 9           Lipase:x      Amylase:x         Blood Gas Arterial, Lactate: 2.1 mmol/L (21 @ 16:13)  Blood Gas Arterial, Lactate: 2.5 mmol/L (21 @ 15:09)  Blood Gas Arterial, Lactate: 1.9 mmol/L (21 @ 14:12)  Blood Gas Arterial, Lactate: 1.8 mmol/L (21 @ 13:37)  Blood Gas Arterial, Lactate: 1.6 mmol/L (21 @ 12:51)  Blood Gas Venous - Lactate: 3.9 mmol/L (21 @ 11:02)  Lactate, Blood: 5.9 mmol/L (21 @ 09:08)  Lactate, Blood: 3.1 mmol/L (09-16-21 @ 04:51)    ABG - ( 16 Sep 2021 16:13 )  pH: 7.27  /  pCO2: 35    /  pO2: 300   / HCO3: 16    / Base Excess: -9.9  /  SaO2: 99.4            ABG - ( 16 Sep 2021 15:09 )  pH: 7.29  /  pCO2: 36    /  pO2: 258   / HCO3: 17    / Base Excess: -8.5  /  SaO2: 100.0           ABG - ( 16 Sep 2021 14:12 )  pH: 7.28  /  pCO2: 35    /  pO2: 244   / HCO3: 16    / Base Excess: -9.5  /  SaO2: 99.3              Coags:     14.3   ----< 1.26    ( 16 Sep 2021 11:02 )     25.6                Urinalysis Basic - ( 16 Sep 2021 07:03 )    Color: Yellow / Appearance: Slightly Turbid / S.010 / pH: x  Gluc: x / Ketone: Trace  / Bili: Negative / Urobili: Negative   Blood: x / Protein: 15 / Nitrite: Negative   Leuk Esterase: Trace / RBC: 0-2 /HPF / WBC 0-2   Sq Epi: x / Non Sq Epi: Occasional / Bacteria: Few          --------------------------------------------------------------------------------------    OTHER LABS    IMAGING RESULTS      ASSESSMENT:  71y Female s/p adnexal mass and R adnexa rsetion, and SBR, left in discontinuity with plan for RTOR for adnexal mass.     PLAN:   Neurologic:   - sedated and intubated  - propofol, fentanyl   - pain control    Respiratory:   - intubated  - AC settings: 15/430/5/50  - monitor SpO2    Cardiovascular:   - on levo .05, soila .21 and vaso .03  - MAP >65 goal  - requires POCUS  - flowtrac  - resuscitate PRN     Gastrointestinal/Nutrition:   - NPO  - in discontinuity plan for RTOR    Renal/Genitourinary:   - echavarria in place  - monitor UOP and electrolytes    Hematologic:   - monitor H/H  - on pressors  - wean off pressors    Infectious Disease:   -zosyn per primary team  - afebrile  - monitor WBC and fever curve    Lines/Tubes:  - echavarria  - L radial A line  - CVC    Endocrine:   -monitor sugars on BMP    Disposition:   SICU  --------------------------------------------------------------------------------------    Critical Care Diagnoses:

## 2021-09-16 NOTE — CHART NOTE - NSCHARTNOTEFT_GEN_A_CORE
Note Type	Ultrasound    PROCEDURE:  [ x ] LIMITED ECHO  [ x ] LIMITED CHEST  [ ] LIMITED RETROPERITONEAL  [ ] LIMITED ABDOMINAL  [ ] LIMITED DVT    FINDINGS:  Lungs: Diffuse B-lines noted in bilateral posterior lung fields. Few scattered B-lines noted in bilateral anterior lung fields. Normal lung sliding.   Heart: Limited views. LV systolic function grossly normal.  IVC: 1.6cm. Unable to measure respiratory variations 2/2 patient sedation. Note Type	Ultrasound    PROCEDURE:  [ x ] LIMITED ECHO  [ x ] LIMITED CHEST  [ ] LIMITED RETROPERITONEAL  [ ] LIMITED ABDOMINAL  [ ] LIMITED DVT    FINDINGS:  Lungs: Diffuse B-lines noted in bilateral posterior lung fields. Scattered B-lines also noted in bilateral anterior lung fields. Normal lung sliding.   Heart: Limited views. LV systolic function grossly normal.  IVC: 1.6cm. Unable to measure respiratory variations 2/2 patient sedation.

## 2021-09-16 NOTE — PROVIDER CONTACT NOTE (CRITICAL VALUE NOTIFICATION) - TEST AND RESULT REPORTED:
Blood Cultures preliminary result: Growth in anaerobic bottle gram negative rods.
lactate 3.1
lactate= 5.9

## 2021-09-16 NOTE — BRIEF OPERATIVE NOTE - NSICDXBRIEFPROCEDURE_GEN_ALL_CORE_FT
PROCEDURES:  Open bilateral salpingo-oophorectomy (BSO) 16-Sep-2021 17:56:50  [place]  Small bowel resection 16-Sep-2021 17:57:03  [place]  Bilateral ureterolysis 16-Sep-2021 17:58:45  [place]  
PROCEDURES:  Open bilateral salpingo-oophorectomy (BSO) 16-Sep-2021 17:56:50  [place]  Small bowel resection 16-Sep-2021 17:57:03  [place]  Bilateral ureterolysis 16-Sep-2021 17:58:45  [place]

## 2021-09-16 NOTE — DISCHARGE NOTE FOR THE EXPIRED PATIENT - HOSPITAL COURSE
70 y/o P2 postmenopausal woman with no known PMHx transferred from [place] ED after she was found to have perforated viscus in the setting of a large abdominal mass. Patient states that she acutely developed severe abdominal pain yesterday evening, also reports associated nausea and vomiting.  Also reported difficulty urinating. Patient states that she has not seen a doctor in at least a decade, does not have a GYN. Patient initially presented to [facility]'s ED.     In [place] ED, patient had a CT scan that showed free air and a 30 cm complex multiseptated abdominal mass, possibly adnexal in origin with omental caking and peritoneal nodules. Also demonstrating 9 cm posterior pelvic mass. Labs notable for uptrending lactate from 3->6 despite fluid resuscitation. Fuentes catheter was placed. Patient was transferred to [facility] ED for GYN ONC and surgical evaluation.     Patient is now s/p adnexal mass and attached R adnexa removal, as well as small bowel resection. Patient left in discontinuity with plan with [facility] for RTOR tomorrow vs. Saturday. At time of encounter, patient is intubated on AC, on levo and soila. Overnight patient became increasingly hypotensive and increasing pressor requirement, patient was also severely acidotic, then lost pulses. Multiple rounds of CPR/ACLS was done, with multiple epi/bicarb/calcium pushes, persistent asystole on monitor. Time of death was called at 2136.

## 2021-09-16 NOTE — CONSULT NOTE ADULT - ASSESSMENT
70 yo female here with no pmhx, smoker, no pshx, here for enlargening tumor and worsening abdominal complaints.  Lactate is elevated. Slightly elevated white count.  Mildly tachycardic with normal BP.

## 2021-09-16 NOTE — ED PROVIDER NOTE - CONSTITUTIONAL, MLM
Well appearing, awake, alert, oriented to person, place, time/situation and in moderate  distress. normal...

## 2021-09-16 NOTE — ED ADULT NURSE NOTE - NSIMPLEMENTINTERV_GEN_ALL_ED
Implemented All Fall Risk Interventions:  [place] to call system. Call bell, personal items and telephone within reach. Instruct patient to call for assistance. Room bathroom lighting operational. Non-slip footwear when patient is off stretcher. Physically safe environment: no spills, clutter or unnecessary equipment. Stretcher in lowest position, wheels locked, appropriate side rails in place. Provide visual cue, wrist band, yellow gown, etc. Monitor gait and stability. Monitor for mental status changes and reorient to person, place, and time. Review medications for side effects contributing to fall risk. Reinforce activity limits and safety measures with patient and family.

## 2021-09-16 NOTE — ED ADULT NURSE NOTE - OBJECTIVE STATEMENT
Patient alert and oriented X 3. Complaining of lower abdominal pain. nausea, vomiting and diarrhea since yesterday. + abdominal distension . Denies chest pain, shortness of breath, headache, dizziness and fever. Lungs clears bilaterally. Respirations even and not labored..

## 2021-09-16 NOTE — ED ADULT NURSE NOTE - CHIEF COMPLAINT QUOTE
sent in from [place] ER for Surgery admission after pt was found to have an abd mass. as per spouse at bedside pt with worsening abd pain for 1 month developed  N/V last night and worsening abd pain. pt presents with Fuentes  IVF ongoing by gravity.

## 2021-09-16 NOTE — H&P ADULT - HISTORY OF PRESENT ILLNESS
DONOVAN GENAO  71y  Female [id number]    HPI:  70 y/o P2 postmenopausal woman transferred from [place] ED after she was found to have perforated viscus in the setting of a large abdominal mass. Patient states that she developed abdominal pain yesterday, and reported associated nausea and vomiting. Also reported difficulty urinating at [facility].     In [place] ED patient had a CT scan that showed free air and a 30 cm complex multiseptated abdominal mass, likely adnexal in origin.  Labs notable for leukocytosis, uptrending lactate from 3->6 despite fluid resuscitation.   72 yo female with no pmhx, +smoker, here for worsening abdominal pain  with associated N/V. Pt states she has noticed abdominal discomfort for the  last couple of months. States over the last day/ day and a half, pain has  worsened. Pt notes that she has associated Nausea, and vomiting. States  vomitus is watery. Does tolerate her PO intake and does not find N/V is  associated with eating. Also notes that she has issues with voiding. States  she has a sensation of needing to void, but unable to. Pt is passing flatus.  Notes her BM have been liquidy, but not malodorous or water like. Finds she  feels very bloated. States her abdomen has enlarged over the last several  weeks. Pt localizes pain and discomfort to lower abdomen/pelvis. States it is  8-9/10 in severity with movement, and 5/10 in severity with rest. Finds pain  does not move, and described as annoying. States pain is constant but only  fluctuates in severity. Denies headaches, SOB, chest pain, back pain, lower  leg/discomfort, faintness, dizziness, or other complaints. Pt does not have a  PCP, GYN. Has not had a colonoscopy        Name of GYN Physician:     POB:    Pgyn: Denies fibroids, cysts, endometriosis, STI's, Abnormal pap smears   PMH:  PSH:  Meds:  All:  SH: Denies smoking use, drug use, alcohol use.   +occasional social alcohol use  Home meds:     Hospital Meds:   MEDICATIONS  (STANDING):  fentaNYL    Injectable 50 MICROGram(s) IV Push Once  norepinephrine Infusion 0.05 MICROgram(s)/kG/Min (6.56 mL/Hr) IV Continuous <Continuous>    MEDICATIONS  (PRN):      Allergies    No Known Allergies    Intolerances        PAST MEDICAL & SURGICAL HISTORY:      FAMILY HISTORY:        Vital Signs Last 24 Hrs  T(C): 38.1 (16 Sep 2021 10:49), Max: 38.1 (16 Sep 2021 10:49)  T(F): 100.5 (16 Sep 2021 10:49), Max: 100.5 (16 Sep 2021 10:49)  HR: 120 (16 Sep 2021 10:49) (120 - 123)  BP: 85/53 (16 Sep 2021 10:49) (85/53 - 133/98)  BP(mean): --  RR: 18 (16 Sep 2021 10:49) (18 - 18)  SpO2: 96% (16 Sep 2021 10:49) (94% - 96%)    Physical Exam:   General: sitting comftorably in bed, NAD   HEENT: neck supple, full ROM  CV: RR S1S2 no m/r/g  Lungs: CTA b/l, good air flow b/l   Back: No CVA tenderness  Abd: Soft, non-tender, non-distended.  Bowel sounds present.    :  No bleeding on pad.    External labia wnl.  Bimanual exam with no cervical motion tenderness, uterus wnl, adnexa non palpable b/l.  Cervix closed vs. Cervix dilated    cm   Speculum Exam: No active bleeding from os.  Physiologic discharge.    Ext: non-tender b/l, no edema     LABS:                              8.1    x     )-----------( 316      ( 16 Sep 2021 11:02 )             27.3           I&O's Detail          RADIOLOGY & ADDITIONAL STUDIES:        Malissa Morejon  PGY-2 DONOVAN GENAO  71y  Female [id number]    HPI:  70 y/o P2 postmenopausal woman transferred from [place] ED after she was found to have perforated viscus in the setting of a large abdominal mass. Patient states that she developed abdominal pain yesterday, and reported associated nausea and vomiting. Also reported difficulty urinating at [facility] ED.  Patient states that she has not seen a doctor in at least a decade, does not have a GYN.     In [place] ED, patient had a CT scan that showed free air and a 30 cm complex multiseptated abdominal mass, likely adnexal in origin with omental caking and peritoneal nodules. Also demonstrating 9 cm posterior pelvic mass. Labs notable for uptrending lactate from 3->6 despite fluid resuscitation. Patient was transferred to [facility] ED for GYN ONC and surgical evaluation.       Name of GYN Physician: None    POB:  x2  PMH: denies  PSH: denies  Meds: none  All: NKDA  SH: +1/2 PPD smoker    Vital Signs Last 24 Hrs  T(C): 38.1 (16 Sep 2021 10:49), Max: 38.1 (16 Sep 2021 10:49)  T(F): 100.5 (16 Sep 2021 10:49), Max: 100.5 (16 Sep 2021 10:49)  HR: 120 (16 Sep 2021 10:49) (120 - 123)  BP: 85/53 (16 Sep 2021 10:49) (85/53 - 133/98)  BP(mean): --  RR: 18 (16 Sep 2021 10:49) (18 - 18)  SpO2: 96% (16 Sep 2021 10:49) (94% - 96%)    Physical Exam:   General: sitting comftorably in bed, NAD   HEENT: neck supple, full ROM  CV: RR S1S2 no m/r/g  Lungs: CTA b/l, good air flow b/l   Back: No CVA tenderness  Abd: Soft, non-tender, non-distended.  Bowel sounds present.    :  No bleeding on pad.    External labia wnl.  Bimanual exam with no cervical motion tenderness, uterus wnl, adnexa non palpable b/l.  Cervix closed vs. Cervix dilated    cm   Speculum Exam: No active bleeding from os.  Physiologic discharge.    Ext: non-tender b/l, no edema     LABS:                              8.1    x     )-----------( 316      ( 16 Sep 2021 11:02 )             27.3           I&O's Detail          RADIOLOGY & ADDITIONAL STUDIES:        Malissa Morejon  PGY-2 ODNOVAN GENAO  71y  Female [id number]    HPI:  70 y/o P2 postmenopausal woman transferred from [place] ED after she was found to have perforated viscus in the setting of a large abdominal mass. Patient states that she developed abdominal pain yesterday, and reported associated nausea and vomiting. Also reported difficulty urinating at [facility] ED.  Patient states that she has not seen a doctor in at least a decade, does not have a GYN.     In [place] ED, patient had a CT scan that showed free air and a 30 cm complex multiseptated abdominal mass, possibly adnexal in origin with omental caking and peritoneal nodules. Also demonstrating 9 cm posterior pelvic mass. Labs notable for uptrending lactate from 3->6 despite fluid resuscitation. Patient was transferred to [facility] ED for GYN ONC and surgical evaluation.       Name of GYN Physician: None    POB:  x2  PMH: denies  PSH: denies  Meds: none  All: NKDA  SH: +1/2 PPD smoker    Vital Signs Last 24 Hrs  T(C): 38.1 (16 Sep 2021 10:49), Max: 38.1 (16 Sep 2021 10:49)  T(F): 100.5 (16 Sep 2021 10:49), Max: 100.5 (16 Sep 2021 10:49)  HR: 120 (16 Sep 2021 10:49) (120 - 123)  BP: 85/53 (16 Sep 2021 10:49) (85/53 - 133/98)  BP(mean): --  RR: 18 (16 Sep 2021 10:49) (18 - 18)  SpO2: 96% (16 Sep 2021 10:49) (94% - 96%)    Physical Exam:   General: sitting comftorably in bed, NAD   HEENT: neck supple, full ROM  CV: RR S1S2 no m/r/g  Lungs: CTA b/l, good air flow b/l   Back: No CVA tenderness  Abd: Soft, non-tender, non-distended.  Bowel sounds present.    :  No bleeding on pad.    External labia wnl.  Bimanual exam with no cervical motion tenderness, uterus wnl, adnexa non palpable b/l.  Cervix closed vs. Cervix dilated    cm   Speculum Exam: No active bleeding from os.  Physiologic discharge.    Ext: non-tender b/l, no edema     LABS:                              8.1    x     )-----------( 316      ( 16 Sep 2021 11:02 )             27.3           I&O's Detail          RADIOLOGY & ADDITIONAL STUDIES:        Malissa Morejon  PGY-2 DONOVAN GENAO  71y  Female [id number]    HPI:  72 y/o P2 postmenopausal woman transferred from [place] ED after she was found to have perforated viscus in the setting of a large abdominal mass. Patient states that she developed abdominal pain yesterday, and reported associated nausea and vomiting. Also reported difficulty urinating at [facility] ED.  Patient states that she has not seen a doctor in at least a decade, does not have a GYN.     In [place] ED, patient had a CT scan that showed free air and a 30 cm complex multiseptated abdominal mass, possibly adnexal in origin with omental caking and peritoneal nodules. Also demonstrating 9 cm posterior pelvic mass. Labs notable for uptrending lactate from 3->6 despite fluid resuscitation. Patient was transferred to [facility] ED for GYN ONC and surgical evaluation.       Name of GYN Physician: None    POB:  x2  PMH: denies  PSH: denies  Meds: none  All: NKDA  SH: +1/2 PPD smoker   DONOVAN GENAO  71y  Female [id number]    HPI:  70 y/o P2 postmenopausal woman with no known PMHx transferred from [place] ED after she was found to have perforated viscus in the setting of a large abdominal mass. Patient states that she acutely developed severe abdominal pain yesterday evening, also reports associated nausea and vomiting.  Also reported difficulty urinating. Patient states that she has not seen a doctor in at least a decade, does not have a GYN. Patient initially presented to [facility] ED.     In [place] ED, patient had a CT scan that showed free air and a 30 cm complex multiseptated abdominal mass, possibly adnexal in origin with omental caking and peritoneal nodules. Also demonstrating 9 cm posterior pelvic mass. Labs notable for uptrending lactate from 3->6 despite fluid resuscitation. Fuentes catheter was placed. Patient was transferred to [facility] ED for GYN ONC and surgical evaluation.       Name of GYN Physician: None    POB:  x2  PMH: denies  PSH: denies  Meds: none  All: NKDA  SH: +1/2 PPD smoker

## 2021-09-16 NOTE — CONSULT NOTE ADULT - SUBJECTIVE AND OBJECTIVE BOX
incompleteGENERAL SURGERY CONSULT NOTE    Patient is a 71y old  Female who presents with a chief complaint of Worsening abdominal pain    HPI:  70 yo female with no pmhx, +smoker, here for worsening abdominal pain with associated N/V. Pt states she has noticed abdominal discomfort for the last couple of months.  States over the last day/ day and a half, pain has worsened.  Pt notes that she has associated Nausea, and vomiting.  States vomitus is watery.  Does tolerate her PO intake and does not find N/V is associated with eating.  Also notes that she has issues with voiding.  States she has a sensation of needing to void, but unable to.  Pt is passing flatus.  Notes her BM have been liquidy, but not malodorous or water like.  Finds she feels very bloated.  States her abdomen has enlarged over the last several weeks.  Pt localizes pain and discomfort to lower abdomen/pelvis.  States it is 8-9/10 in severity with movement, and 5/10 in severity with rest.  Finds pain does not move, and described as annoying.  States pain is constant but only fluctuates in severity.       PAST MEDICAL & SURGICAL HISTORY:      Review of Systems:    I have reviewed 9 systems with the patient and the only positive findings were     MEDICATIONS  (STANDING):    MEDICATIONS  (PRN):      Allergies    No Known Allergies    Intolerances        SOCIAL HISTORY          Smoking: Yes [ ]  No [ ]   ______pk yrs          ETOH  Yes [ ]  No [ ]  Social [ ]          DRUGS:  Yes [ ]  No [ ]  if so what______________    FAMILY HISTORY:      Vital Signs Last 24 Hrs  T(C): 37.3 (16 Sep 2021 05:28), Max: 37.3 (16 Sep 2021 05:28)  T(F): 99.2 (16 Sep 2021 05:28), Max: 99.2 (16 Sep 2021 05:28)  HR: 116 (16 Sep 2021 05:28) (110 - 116)  BP: 116/76 (16 Sep 2021 05:28) (91/62 - 116/76)  BP(mean): --  RR: 18 (16 Sep 2021 05:28) (16 - 18)  SpO2: 100% (16 Sep 2021 05:28) (98% - 100%)    Physical Exam:    General:  Appears stated age, well-groomed, well-nourished, no distress  Eyes : LINUS  HENT:  WNL, no JVD  Chest:  clear breath sounds  Cardiovascular:  Regular rate & rhythm  Abdomen:    Extremities:    Skin:  No rash  Musculoskeletal:  normal strength  Neuro/Psych:  Alert, oriented tp time, place and person       LABS:                        9.5    11.36 )-----------( 343      ( 16 Sep 2021 04:13 )             30.4     09-16    137  |  106  |  20  ----------------------------<  127<H>  3.5   |  21<L>  |  1.30    Ca    8.6      16 Sep 2021 04:13    TPro  7.0  /  Alb  3.0<L>  /  TBili  2.0<H>  /  DBili  x   /  AST  31  /  ALT  16  /  AlkPhos  92  09-16    PT/INR - ( 16 Sep 2021 04:36 )   PT: 13.6 sec;   INR: 1.17 ratio         PTT - ( 16 Sep 2021 04:36 )  PTT:26.0 sec      RADIOLOGY & ADDITIONAL STUDIES:    Risks, benefits, and alternatives to treatment discussed. All questions answered with understanding. incompleteGENERAL SURGERY CONSULT NOTE    Patient is a 71y old  Female who presents with a chief complaint of Worsening abdominal pain    HPI:  70 yo female with no pmhx, +smoker, here for worsening abdominal pain with associated N/V. Pt states she has noticed abdominal discomfort for the last couple of months.  States over the last day/ day and a half, pain has worsened.  Pt notes that she has associated Nausea, and vomiting.  States vomitus is watery.  Does tolerate her PO intake and does not find N/V is associated with eating.  Also notes that she has issues with voiding.  States she has a sensation of needing to void, but unable to.  Pt is passing flatus.  Notes her BM have been liquidy, but not malodorous or water like.  Finds she feels very bloated.  States her abdomen has enlarged over the last several weeks.  Pt localizes pain and discomfort to lower abdomen/pelvis.  States it is 8-9/10 in severity with movement, and 5/10 in severity with rest.  Finds pain does not move, and described as annoying.  States pain is constant but only fluctuates in severity.  Denies headaches, SOB, chest pain, back pain, lower leg/discomfort, faintness, dizziness, or other complaints.  Pt does not have  a PCP, GYN.  Has not had a colonoscopy      PAST MEDICAL & SURGICAL HISTORY:  No pmhx    No PSHX      REVIEW OF SYSTEMS      General:	 No fevers, no chills    Skin/Breast:  no rashes  	  Ophthalmologic:  no change in vision  	  ENMT:	No recent URI, no sore throat.     Respiratory and Thorax:  No SOB, no chest pain  	  Cardiovascular:	 no palpitations    Gastrointestinal:	  See HPI    Genitourinary:	  Voiding hesitency.      Musculoskeletal:	  no weakness    Neurological:	 no headaches, no dizziness    Psychiatric:	no anxiety, depression    Hematology/Lymphatics:	 no blood dyscrasias    Endocrine:	no hot/cold intolerance.     Allergic/Immunologic:	 no allergies.         MEDICATIONS  (STANDING):    MEDICATIONS  (PRN):      Allergies    No Known Allergies    Intolerances        SOCIAL HISTORY          Smoking: Yes [X ]  No [ ]   1/2 ppd x 20 yrs          ETOH  Yes [ ]  No [X ]  Social [ ]          DRUGS:  Yes [ ]  No [X ]  if so what______________    FAMILY HISTORY      Vital Signs Last 24 Hrs  T(C): 37.3 (16 Sep 2021 05:28), Max: 37.3 (16 Sep 2021 05:28)  T(F): 99.2 (16 Sep 2021 05:28), Max: 99.2 (16 Sep 2021 05:28)  HR: 116 (16 Sep 2021 05:28) (110 - 116)  BP: 116/76 (16 Sep 2021 05:28) (91/62 - 116/76)  BP(mean): --  RR: 18 (16 Sep 2021 05:28) (16 - 18)  SpO2: 100% (16 Sep 2021 05:28) (98% - 100%)    Physical Exam:    General:  Appears stated age, well-groomed, well-nourished, no distress.    Eyes : eom'S INTACT.   nECK:  soft supple.  No palpable lymphadenopathy  Chest:  CTA B/L No wheezes no rales no rhonchi  Cardiovascular:  Clear S1 S2  Abdomen:   BS+ NT.  + Enlarged palpable mass from pubis to xyphoid.  No Tympany to percussion.    Extremities:  + Variscosities to lower extremities. Palpable pulses to DP/PT, Negative HOmans sign.   Skin:  No rash  Musculoskeletal:  normal strength  Neuro/Psych:  Alert, oriented to time, place and person       LABS:                        9.5    11.36 )-----------( 343      ( 16 Sep 2021 04:13 )             30.4     09-16    137  |  106  |  20  ----------------------------<  127<H>  3.5   |  21<L>  |  1.30    Ca    8.6      16 Sep 2021 04:13    TPro  7.0  /  Alb  3.0<L>  /  TBili  2.0<H>  /  DBili  x   /  AST  31  /  ALT  16  /  AlkPhos  92  09-16    PT/INR - ( 16 Sep 2021 04:36 )   PT: 13.6 sec;   INR: 1.17 ratio         PTT - ( 16 Sep 2021 04:36 )  PTT:26.0 sec      RADIOLOGY & ADDITIONAL STUDIES:    Risks, benefits, and alternatives to treatment discussed. All questions answered with understanding.

## 2021-09-16 NOTE — ED PROVIDER NOTE - PROGRESS NOTE DETAILS
Dr Merritt is ending call and is requesting call to  DR Romero DR Romero suggesting due to the size we will need to transfer to GYN Oncology DR Romero suggesting due to the size we will need to transfer to GYN Oncology and surgery at [facility]. JEREMIAH Grimaldo GYN oncology he will accept if the patient is stable for transport JEREMIAH Duque surgery at [facility]

## 2021-09-16 NOTE — H&P ADULT - NSHPPHYSICALEXAM_GEN_ALL_CORE
Gen: writhing in bed, visibly distressed  CVS: Tachycardic  Lungs: Non-labored breathing  Abd: Large non-mobile abdominal mass, diffusely tender to palpation, +rigidity, no rebound or guarding  : Fuentes draining clear urine  Ext: Edematous bilaterally Gen: writhing in bed, visibly distressed  CVS: Tachycardic  Lungs: Non-labored breathing  Abd: Large non-mobile abdominal mass, diffusely tender to palpation, +rigidity, no rebound or guarding  : Fuentes draining clear urine  Ext: Lowe extremities edematous bilaterally

## 2021-09-16 NOTE — CHART NOTE - NSCHARTNOTEFT_GEN_A_CORE
Pt has been steadily going up on pressors while get fluid resuscitated   Stat labs sent, pt's blood gas; pt severely acidotic to 7.07, HCO3 6, potassium 8.1  Bicarb IVP, bicarb infusion, calcium gluconate, amp of dextrose, regular insulin ordered  Pt cardiac arrested  ACLS protocol initiated  Pt went through multiple rooms of ACLS, received epi, bicarb, and calcium  Pt  at 9:38pm

## 2021-09-16 NOTE — BRIEF OPERATIVE NOTE - OPERATION/FINDINGS
Exploratory laparotomy, resection of large necrotic intraabdominal mass en-bloc with small bowel (ileum), right adnexa. Please see brief op note from GYN team for more details. Entire small bowel examined, no additional enterotomy / perforation identified. The stapled ileum and right colon appeared dusky. There may be a persistent distal obstruction as the distal colon was not thoroughly examined. No anastomosis was performed. An Abthera VAC was placed and the patient was transferred to PACU/SICU for continued resuscitation with plan for second look laparotomy. Exploratory laparotomy, resection of large necrotic intraabdominal mass en-bloc with small bowel (ileum), right adnexa. Please see brief op note from GYN team for more details. Entire small bowel examined, no additional enterotomy / perforation identified. The stapled ileum and right colon appeared dusky. Therefore, the general surgery team led by Dr. Duque performed an additional 5cm small bowel resection at the TI.  There may be a persistent distal obstruction as the distal colon was not thoroughly examined. No anastomosis was performed. An Abthera VAC was placed and the patient was transferred to PACU/SICU for continued resuscitation with plan for second look laparotomy.

## 2021-09-16 NOTE — ED PROVIDER NOTE - PHYSICAL EXAMINATION
General appearance: NAD, conversant, afebrile    Neck: Trachea midline; Full range of motion, supple   Pulm: CTA bl, normal respiratory effort and no intercostal retractions, normal work of breathing   CV: RRR, No murmurs, rubs, or gallops   Abdomen: Palpable large abdominal mass, diffusely ttp, no guarding or rebound   Extremities: No peripheral edema    Skin: Dry, normal temperature, turgor and texture; no rash   Psych: Appropriate affect, cooperative; alert and oriented to person, place and time

## 2021-09-16 NOTE — CONSULT NOTE ADULT - ATTENDING COMMENTS
I have reviewed the history/interviewed patient and , pertinent labs and imaging, and discussed the care with the consult residents Babar Marcos and Cyrus.  More than 50% of this 80 minute encounter including face to face with the patient was spent counseling and/or coordination of care on intraabdominal mass with ischemic bowel.  Time included review of vitals, labs, imaging, discussion with consultants and coordination with the operating room/emergency department.    Chief complaint:  abdominal pain, hypotension    The active issues are:  1. peritonitis, septic shock 2/2 perforated intra-abdominal mass/intestines with ischemia of small/large bowel    R/B/A/R were explained to patient and her , they understood and agreed to let us proceed in a coordinated intervention with gyn oncology.    The Acute Care Surgery (B Team) Attending Group Practice:  Dr. Alise Duque, Dr. Anthony Matthews, Dr. Theodore Schroeder, Dr. [place], Dr. Veronica Ashton    urgent issues - spectra [id number]  nonurgent issues - [phone]  patient appointments or afterhours - [phone]
I have personally interviewed and examined this patient, reviewed pertinent labs and imaging, and discussed the case with residents, physician assistants, and nurses in SICU after her arrival from the PACU.    90   minutes in total were spent in providing direct critical care for the diagnoses, assessment and plan outlined below.  These diagnoses are unrelated to the surgical procedure.  Additionally, time spent in the performance of separately billable procedures was not counted toward the critical care time.  There is no overlap.  Time included review of vitals, labs, imaging, discussion with her  and two sons.    Chief complaint:  septic shock, hypotension, metabolic acidosis, hyperkalemia, cardiac arrest    Despite aggressive resuscitative efforts for the past few hours in the SICU, utilizing escalating doses of vasopressors, mechanical ventilatory support (RR increased but patient did not have resp acidosis) and bicarbonate push (metabolic acidosis and oliguria), the patient experienced a cardiac arrest (asystole) and  despite CPR attempt.  The family was updated during the code blue and they agreed with our medical judgment.  CPR with chest compressions, manual bag ventilation and 100% FiO2, epinephrine, bicarb and calcium was performed.  She had asystole and was pulseless at both pulse checks.  She was pronounced dead and I informed the family, gyn onc service and .

## 2021-09-16 NOTE — ED ADULT NURSE REASSESSMENT NOTE - NS ED NURSE REASSESS COMMENT FT1
Patient evaluated by surgical team. Patient attempted to void multiple time with no success. Patient complaining of suprapubic pain. Patient bladder scan for greater than 166ml. Dr. Duque made aware. Fuentes catheter placed with sterile technique. Initial output 210 ml. Sample sent to lab. Dr. Duque made aware. Pending GYN consult. Kevin HOWARD

## 2021-09-17 PROBLEM — Z00.00 ENCOUNTER FOR PREVENTIVE HEALTH EXAMINATION: Noted: 2021-09-17

## 2021-09-17 LAB
CULTURE RESULTS: NO GROWTH — SIGNIFICANT CHANGE UP
SPECIMEN SOURCE: SIGNIFICANT CHANGE UP

## 2021-09-18 LAB
CULTURE RESULTS: SIGNIFICANT CHANGE UP
SPECIMEN SOURCE: SIGNIFICANT CHANGE UP
SPECIMEN SOURCE: SIGNIFICANT CHANGE UP

## 2021-09-20 PROBLEM — Z78.9 OTHER SPECIFIED HEALTH STATUS: Chronic | Status: ACTIVE | Noted: 2021-01-01

## 2021-10-04 LAB — SURGICAL PATHOLOGY STUDY: SIGNIFICANT CHANGE UP

## 2023-08-31 NOTE — ED ADULT NURSE NOTE - NS TRANSFER DISPOSITION PATIENT BELONGINGS
What Type Of Note Output Would You Prefer (Optional)?: Bullet Format How Severe Is Your Skin Lesion?: mild Has Your Skin Lesion Been Treated?: not been treated Is This A New Presentation, Or A Follow-Up?: Skin Lesions  has jewelry/given to family

## 2023-11-16 NOTE — PROCEDURE NOTE - I WAS PRESENT DURING THE KEY PORTIONS OF THE PROCEDURE AND IMMEDIATELY AVAILABLE DURING THE ENTIRE PROCEDURE
Hypertension     Conversation: Care Coordination    Current Issues/Problems reviewed on call: Graduates goals met outreach completed. Instruct on the use of the Hypertension Action Plan, including signs and symptoms of worsening Hypertension (symptoms of a potential exacerbation) and the recommended actions to take, including the importance of reporting symptoms early.     Details for Interventions/Education completed on call: Patient has agreed to participate in the Condition Management Program. Evaluated frequency of blood pressure monitoring and adherence to prescribed medication regime. Reviewed the importance of keeping regularly scheduled medical appointments.      Pt has successfully completed the HTN RPM program on Day 162,   B/p 129/79. Pt endorses being  confident in being able to control  b/p at home. Pt  has modified  diet and incorporating low sodium alternatives as well as focused on recommended hydration each day.  Pt acknowledges she will be  conscious of drinking more water .  CM re-reviewed HTN education and action plan. Pt able to verbalize s/s HTN exacerbation /stroke symptoms and access to care. . Pt aware  may call CM for any future issues and concerns. Pt requested a fed ex label and will drop off tablet.  Pt aware to keep b/p kit and states  will continue with  daily routine and healthy lifestyle. All questions answered,Graduates as Goals met.  
Statement Selected

## 2024-05-15 NOTE — ED PROVIDER NOTE - REGARDING
[As Noted in HPI] : as noted in HPI [FreeTextEntry3] : In the process of being evaluated for possible DM retinopathy.  [Eye Pain] : no eye pain [Negative] : Eyes consult

## 2024-11-12 NOTE — ED ADULT NURSE NOTE - NS ED NOTE ABUSE SUSPICION NEGLECT YN
June 6, 2023      To Whom It May Concern:      Burke Domingo was seen in our Emergency Department today, 06/06/23. He may return to work after being cleared by follow-up physician.    Sincerely,        Bonilla Morales RN        
Verbalized Understanding
No